# Patient Record
Sex: MALE | Race: WHITE | NOT HISPANIC OR LATINO | Employment: UNEMPLOYED | URBAN - METROPOLITAN AREA
[De-identification: names, ages, dates, MRNs, and addresses within clinical notes are randomized per-mention and may not be internally consistent; named-entity substitution may affect disease eponyms.]

---

## 2023-08-04 ENCOUNTER — APPOINTMENT (OUTPATIENT)
Dept: CT IMAGING | Facility: HOSPITAL | Age: 23
End: 2023-08-04
Payer: MEDICAID

## 2023-08-04 ENCOUNTER — HOSPITAL ENCOUNTER (EMERGENCY)
Facility: HOSPITAL | Age: 23
Discharge: HOME | End: 2023-08-04
Attending: EMERGENCY MEDICINE
Payer: MEDICAID

## 2023-08-04 VITALS
WEIGHT: 198.63 LBS | HEIGHT: 65 IN | BODY MASS INDEX: 33.09 KG/M2 | DIASTOLIC BLOOD PRESSURE: 80 MMHG | OXYGEN SATURATION: 99 % | RESPIRATION RATE: 18 BRPM | HEART RATE: 101 BPM | TEMPERATURE: 97.7 F | SYSTOLIC BLOOD PRESSURE: 121 MMHG

## 2023-08-04 DIAGNOSIS — R10.9 ABDOMINAL PAIN, UNSPECIFIED ABDOMINAL LOCATION: Primary | ICD-10-CM

## 2023-08-04 DIAGNOSIS — K59.00 CONSTIPATION, UNSPECIFIED CONSTIPATION TYPE: ICD-10-CM

## 2023-08-04 LAB
ALBUMIN SERPL-MCNC: 4.1 G/DL (ref 3.5–5.2)
ALBUMIN/GLOB SERPL: 1.3 G/DL
ALP SERPL-CCNC: 99 U/L (ref 39–117)
ALT SERPL W P-5'-P-CCNC: 21 U/L (ref 1–33)
ANION GAP SERPL CALCULATED.3IONS-SCNC: 13 MMOL/L (ref 5–15)
AST SERPL-CCNC: 25 U/L (ref 1–32)
B-HCG UR QL: NEGATIVE
BACTERIA UR QL AUTO: ABNORMAL /HPF
BASOPHILS # BLD AUTO: 0.1 10*3/MM3 (ref 0–0.2)
BASOPHILS NFR BLD AUTO: 0.8 % (ref 0–1.5)
BILIRUB SERPL-MCNC: 0.3 MG/DL (ref 0–1.2)
BILIRUB UR QL STRIP: NEGATIVE
BUN SERPL-MCNC: 10 MG/DL (ref 6–20)
BUN/CREAT SERPL: 9.9 (ref 7–25)
CALCIUM SPEC-SCNC: 9.1 MG/DL (ref 8.6–10.5)
CHLORIDE SERPL-SCNC: 104 MMOL/L (ref 98–107)
CLARITY UR: CLEAR
CO2 SERPL-SCNC: 15 MMOL/L (ref 22–29)
COLOR UR: YELLOW
CREAT SERPL-MCNC: 1.01 MG/DL (ref 0.57–1)
DEPRECATED RDW RBC AUTO: 44.2 FL (ref 37–54)
EGFRCR SERPLBLD CKD-EPI 2021: 80.4 ML/MIN/1.73
EOSINOPHIL # BLD AUTO: 0.3 10*3/MM3 (ref 0–0.4)
EOSINOPHIL NFR BLD AUTO: 4.5 % (ref 0.3–6.2)
ERYTHROCYTE [DISTWIDTH] IN BLOOD BY AUTOMATED COUNT: 15.3 % (ref 12.3–15.4)
GLOBULIN UR ELPH-MCNC: 3.2 GM/DL
GLUCOSE SERPL-MCNC: 95 MG/DL (ref 65–99)
GLUCOSE UR STRIP-MCNC: NEGATIVE MG/DL
HCT VFR BLD AUTO: 41.3 % (ref 37.5–51)
HGB BLD-MCNC: 13.5 G/DL (ref 13–17.7)
HGB UR QL STRIP.AUTO: ABNORMAL
HOLD SPECIMEN: NORMAL
HYALINE CASTS UR QL AUTO: ABNORMAL /LPF
KETONES UR QL STRIP: NEGATIVE
LEUKOCYTE ESTERASE UR QL STRIP.AUTO: ABNORMAL
LIPASE SERPL-CCNC: 24 U/L (ref 13–60)
LYMPHOCYTES # BLD AUTO: 1.8 10*3/MM3 (ref 0.7–3.1)
LYMPHOCYTES NFR BLD AUTO: 26.9 % (ref 19.6–45.3)
MCH RBC QN AUTO: 27.3 PG (ref 26.6–33)
MCHC RBC AUTO-ENTMCNC: 32.6 G/DL (ref 31.5–35.7)
MCV RBC AUTO: 83.7 FL (ref 79–97)
MONOCYTES # BLD AUTO: 0.4 10*3/MM3 (ref 0.1–0.9)
MONOCYTES NFR BLD AUTO: 5.6 % (ref 5–12)
NEUTROPHILS NFR BLD AUTO: 4.1 10*3/MM3 (ref 1.7–7)
NEUTROPHILS NFR BLD AUTO: 62.2 % (ref 42.7–76)
NITRITE UR QL STRIP: NEGATIVE
NRBC BLD AUTO-RTO: 0 /100 WBC (ref 0–0.2)
PH UR STRIP.AUTO: 8 [PH] (ref 5–8)
PLATELET # BLD AUTO: 369 10*3/MM3 (ref 140–450)
PMV BLD AUTO: 7 FL (ref 6–12)
POTASSIUM SERPL-SCNC: 4.1 MMOL/L (ref 3.5–5.2)
PROT SERPL-MCNC: 7.3 G/DL (ref 6–8.5)
PROT UR QL STRIP: NEGATIVE
RBC # BLD AUTO: 4.93 10*6/MM3 (ref 4.14–5.8)
RBC # UR STRIP: ABNORMAL /HPF
REF LAB TEST METHOD: ABNORMAL
SODIUM SERPL-SCNC: 132 MMOL/L (ref 136–145)
SP GR UR STRIP: 1.01 (ref 1–1.03)
SQUAMOUS #/AREA URNS HPF: ABNORMAL /HPF
UROBILINOGEN UR QL STRIP: ABNORMAL
WBC # UR STRIP: ABNORMAL /HPF
WBC NRBC COR # BLD: 6.6 10*3/MM3 (ref 3.4–10.8)
WHOLE BLOOD HOLD COAG: NORMAL
WHOLE BLOOD HOLD SPECIMEN: NORMAL

## 2023-08-04 PROCEDURE — 85025 COMPLETE CBC W/AUTO DIFF WBC: CPT

## 2023-08-04 PROCEDURE — 25010000002 ONDANSETRON PER 1 MG: Performed by: PHYSICIAN ASSISTANT

## 2023-08-04 PROCEDURE — 80053 COMPREHEN METABOLIC PANEL: CPT

## 2023-08-04 PROCEDURE — 81001 URINALYSIS AUTO W/SCOPE: CPT | Performed by: PHYSICIAN ASSISTANT

## 2023-08-04 PROCEDURE — 99285 EMERGENCY DEPT VISIT HI MDM: CPT

## 2023-08-04 PROCEDURE — 81025 URINE PREGNANCY TEST: CPT | Performed by: PHYSICIAN ASSISTANT

## 2023-08-04 PROCEDURE — 74177 CT ABD & PELVIS W/CONTRAST: CPT

## 2023-08-04 PROCEDURE — 25510000001 IOPAMIDOL PER 1 ML: Performed by: PHYSICIAN ASSISTANT

## 2023-08-04 PROCEDURE — P9612 CATHETERIZE FOR URINE SPEC: HCPCS

## 2023-08-04 PROCEDURE — 83690 ASSAY OF LIPASE: CPT | Performed by: PHYSICIAN ASSISTANT

## 2023-08-04 PROCEDURE — 96374 THER/PROPH/DIAG INJ IV PUSH: CPT

## 2023-08-04 RX ORDER — POLYETHYLENE GLYCOL 3350 17 G/17G
17 POWDER, FOR SOLUTION ORAL DAILY
Qty: 510 G | Refills: 0 | Status: SHIPPED | OUTPATIENT
Start: 2023-08-04

## 2023-08-04 RX ORDER — ONDANSETRON 2 MG/ML
4 INJECTION INTRAMUSCULAR; INTRAVENOUS ONCE
Status: COMPLETED | OUTPATIENT
Start: 2023-08-04 | End: 2023-08-04

## 2023-08-04 RX ORDER — SODIUM CHLORIDE 0.9 % (FLUSH) 0.9 %
10 SYRINGE (ML) INJECTION AS NEEDED
Status: DISCONTINUED | OUTPATIENT
Start: 2023-08-04 | End: 2023-08-04 | Stop reason: HOSPADM

## 2023-08-04 RX ADMIN — ONDANSETRON 4 MG: 2 INJECTION INTRAMUSCULAR; INTRAVENOUS at 15:46

## 2023-08-04 RX ADMIN — IOPAMIDOL 100 ML: 755 INJECTION, SOLUTION INTRAVENOUS at 16:41

## 2023-08-04 NOTE — ED NOTES
Pt has been in the bathroom with significant other present for over 20 min. Will finish labs and reassess vitals once they return to room.

## 2023-08-04 NOTE — DISCHARGE INSTRUCTIONS
Take your medication at home as prescribed.  Recommend taking MiraLAX once daily, can increase to twice daily or every other day depending on your body's response    Recommend following up with your urologist for reevaluation of your medications as needed  Follow-up with general surgery if your rectal prolapse worsens    Return to the ER for new or worsening symptoms

## 2023-08-04 NOTE — ED PROVIDER NOTES
Subjective   History of Present Illness  Chief Complaint: Abdominal pain, constipation    Patient is a 23-year-old  female that identifies as male history of arthritis, asthma presents to the ER with complaints of abdominal pain, distention and feelings of constipation for a few weeks.  The patient states that he has had issues with urinary incontinence for quite some time, he currently takes oxybutynin and another medication for incontinence.  He states he feels that these medications are making him constipated.  He states that he has been taking stool softeners and laxatives and did have a very small bowel movement this morning.  Denies chest pain or shortness of breath.  No headache or fever or chills.  He reports no significant abdominal surgical history.  Patient states that he feels very bloated but is passing gas today.  Patient also reports rectal prolapse, states that he has been dealing with this for quite some time and has had colonoscopies in the past but has not been seen by general surgery.  He states that the prolapse at this time is minimal does report discomfort which he attributes to straining due to his constipation.  He also states that since taking oxybutynin and other urinary incontinence medications he feels he has to strain in order to pee which then causes his rectal prolapse to be worse.  He denies any anal intercourse or trauma.    PCP: Jackie Uriarte    History provided by:  Patient    Review of Systems   Constitutional:  Negative for chills and fever.   HENT:  Negative for sore throat and trouble swallowing.    Respiratory:  Negative for shortness of breath and wheezing.    Cardiovascular:  Negative for chest pain.   Gastrointestinal:  Positive for abdominal distention, abdominal pain, constipation, nausea and rectal pain. Negative for diarrhea and vomiting.        Rectal prolapse   Genitourinary:  Negative for dysuria, vaginal bleeding and vaginal discharge.   Skin:  Negative  for rash.   Neurological:  Negative for weakness and headaches.   Psychiatric/Behavioral:  Negative for behavioral problems.    All other systems reviewed and are negative.    Past Medical History:   Diagnosis Date    Arthritis     Asthma     Depression     Environmental allergies        Allergies   Allergen Reactions    Cariprazine Nausea And Vomiting, Palpitations and Shortness Of Breath    Fluoxetine Hcl Other (See Comments)    Testosterone Cypionate Itching, Rash and Swelling       No past surgical history on file.    No family history on file.    Social History     Socioeconomic History    Marital status: Single   Tobacco Use    Smoking status: Never   Substance and Sexual Activity    Alcohol use: No    Drug use: No           Objective   Physical Exam  Vitals and nursing note reviewed.   Constitutional:       Appearance: Normal appearance. He is well-developed and normal weight. He is not ill-appearing or toxic-appearing.   HENT:      Head: Normocephalic and atraumatic.      Mouth/Throat:      Mouth: Mucous membranes are moist.      Pharynx: No oropharyngeal exudate.   Eyes:      Pupils: Pupils are equal, round, and reactive to light.   Cardiovascular:      Rate and Rhythm: Normal rate and regular rhythm. Tachycardia present.      Pulses: Normal pulses.      Heart sounds: Normal heart sounds. No murmur heard.  Pulmonary:      Effort: Pulmonary effort is normal. No respiratory distress.      Breath sounds: Normal breath sounds. No wheezing.   Abdominal:      General: Bowel sounds are normal. There is no distension.      Palpations: Abdomen is soft.      Tenderness: There is abdominal tenderness. There is no guarding.   Genitourinary:     Comments: Patient reports rectum is not prolapsed at this time  Skin:     General: Skin is warm and dry.      Capillary Refill: Capillary refill takes less than 2 seconds.      Findings: No rash.   Neurological:      General: No focal deficit present.      Mental Status: He is  "alert and oriented to person, place, and time.   Psychiatric:         Mood and Affect: Mood normal.         Behavior: Behavior normal.       Procedures           ED Course  ED Course as of 08/04/23 1726   Fri Aug 04, 2023   1707 Patient had large bowel movement while in the ER and does report feeling better vital signs improved.  Lab work and imaging discussed with patient at bedside, all questions answered. [MC]      ED Course User Index  [MC] Bhumika Luna PA    /80 (BP Location: Right arm, Patient Position: Lying)   Pulse 101   Temp 97.7 øF (36.5 øC) (Oral)   Resp 18   Ht 165.1 cm (65\")   Wt 90.1 kg (198 lb 10.2 oz)   SpO2 99%   BMI 33.05 kg/mý   Labs Reviewed   COMPREHENSIVE METABOLIC PANEL - Abnormal; Notable for the following components:       Result Value    Creatinine 1.01 (*)     Sodium 132 (*)     CO2 15.0 (*)     All other components within normal limits    Narrative:     GFR Normal >60  Chronic Kidney Disease <60  Kidney Failure <15     URINALYSIS W/ CULTURE IF INDICATED - Abnormal; Notable for the following components:    Blood, UA Moderate (2+) (*)     Leuk Esterase, UA Moderate (2+) (*)     All other components within normal limits    Narrative:     In absence of clinical symptoms, the presence of pyuria, bacteria, and/or nitrites on the urinalysis result does not correlate with infection.   URINALYSIS, MICROSCOPIC ONLY - Abnormal; Notable for the following components:    RBC, UA 13-20 (*)     WBC, UA 3-5 (*)     All other components within normal limits   CBC WITH AUTO DIFFERENTIAL - Normal   LIPASE - Normal   PREGNANCY, URINE - Normal   RAINBOW DRAW    Narrative:     The following orders were created for panel order [place] Draw.  Procedure                               Abnormality         Status                     ---------                               -----------         ------                     Green Top (Gel)[[id number]]                                  Final result            "    Lavender Top[[id number]]                                     Final result               Gold Top - SST[[id number]]                                   Final result               Light Blue Top[[id number]]                                   Final result                 Please view results for these tests on the individual orders.   CBC AND DIFFERENTIAL    Narrative:     The following orders were created for panel order CBC & Differential.  Procedure                               Abnormality         Status                     ---------                               -----------         ------                     CBC Auto Differential[[id number]]        Normal              Final result                 Please view results for these tests on the individual orders.   GREEN TOP   LAVENDER TOP   GOLD TOP - SST   LIGHT BLUE TOP   EXTRA TUBES    Narrative:     The following orders were created for panel order Extra Tubes.  Procedure                               Abnormality         Status                     ---------                               -----------         ------                     Gold Top - SST[[id number]]                                   Final result                 Please view results for these tests on the individual orders.   GOLD TOP - SST     Medications   sodium chloride 0.9 % flush 10 mL (has no administration in time range)   sodium chloride 0.9 % flush 10 mL (has no administration in time range)   ondansetron (ZOFRAN) injection 4 mg (4 mg Intravenous Given 8/4/23 1546)   iopamidol (ISOVUE-370) 76 % injection 100 mL (100 mL Intravenous Given 8/4/23 1641)     CT Abdomen Pelvis With Contrast    Result Date: 8/4/2023  Impression: No acute abnormality in the abdomen or pelvis. Electronically Signed: Herbert Liang  8/4/2023 4:49 PM EDT  Workstation ID: [id number]                                          Medical Decision Making  Differential Dx (Includes but not limited to): Constipation, obstruction, prolapse, UTI,  diverticulitis, bowel obstruction, fistula, perforation  Medical Records Reviewed: No pertinent records to review  Labs:, Interpretation urinalysis negative for UTI, no bacteria.  CBC no leukocytosis, CMP sodium 132.  Pregnancy negative.  Imaging: On my interpretation no obvious infectious process or signs of obstruction  Telemetry: N/A  Testing considered but not ordered: Chest x-ray patient denies chest pain or shortness of breath  Nature of Complaint: Acute  Admission vs Discharge: Discharge  Discussion: While in the ED IV was placed and labs were obtained appropriate PPE was worn during exam and throughout all encounters with the patient.  Patient had the above evaluation.  IV established, lab work obtained patient given Zofran for nausea.  Lab work reassuring.  CT abdomen shows no acute infectious process or signs of obstruction.  Patient was able to have successful large bowel movement while in the ER.  He reports that he currently does not have rectal prolapse, seems to only be an issue when he is straining.  Strongly recommended daily stool softener to prevent further constipation and straining.  Recommended follow-up with his urologist and with general surgery if prolapse becomes worse.    Discharge plan and instructions were discussed with the patient who verbalized understanding and is in agreement with the plan, all questions were answered at this time.  Patient is aware of signs symptoms that would require immediate return to the emergency room.  Patient understands importance of following up with primary care provider for further evaluation and worsening concerns as well as blood pressure recheck in the next 4 weeks.    Patient was discharged in improved stable condition with an upright steady gait.    Patient is aware that discharge does not mean that nothing is wrong but indicates no emergencies present and they must continue care with follow-up as given below or physician of his choice.    Problems  Addressed:  Abdominal pain, unspecified abdominal location: acute illness or injury  Constipation, unspecified constipation type: acute illness or injury    Amount and/or Complexity of Data Reviewed  Labs: ordered. Decision-making details documented in ED Course.  Radiology: ordered. Decision-making details documented in ED Course.    Risk  Prescription drug management.        Final diagnoses:   Abdominal pain, unspecified abdominal location   Constipation, unspecified constipation type       ED Disposition  ED Disposition       ED Disposition   Discharge    Condition   Stable    Comment   --               Jackie Uriarte  [address] [place] [address] [phone]    Schedule an appointment as soon as possible for a visit in 2 days  As needed, If symptoms worsen    Eli Canales MD  [address] [place] [address] [phone]    Schedule an appointment as soon as possible for a visit in 2 days  As needed, If symptoms worsen         Medication List        New Prescriptions      polyethylene glycol 17 GM/SCOOP powder  Commonly known as: MIRALAX  Take 17 g by mouth Daily.               Where to Get Your Medications        These medications were sent to [id number] - [place], [place] - [address] AT [facility] - [phone]  - [phone] FX  [address] [place] [address]      Phone: [phone]   polyethylene glycol 17 GM/SCOOP powder            Bhumika Luna PA  08/04/23 4165

## 2023-08-24 ENCOUNTER — OFFICE VISIT (OUTPATIENT)
Dept: SURGERY | Facility: CLINIC | Age: 23
End: 2023-08-24
Payer: MEDICAID

## 2023-08-24 VITALS
DIASTOLIC BLOOD PRESSURE: 87 MMHG | HEART RATE: 128 BPM | TEMPERATURE: 99.3 F | WEIGHT: 199.2 LBS | BODY MASS INDEX: 33.19 KG/M2 | HEIGHT: 65 IN | SYSTOLIC BLOOD PRESSURE: 126 MMHG | OXYGEN SATURATION: 98 %

## 2023-08-24 DIAGNOSIS — K64.9 HEMORRHOIDS, UNSPECIFIED HEMORRHOID TYPE: Primary | ICD-10-CM

## 2023-08-24 PROCEDURE — 99204 OFFICE O/P NEW MOD 45 MIN: CPT | Performed by: SURGERY

## 2023-08-24 PROCEDURE — 1159F MED LIST DOCD IN RCRD: CPT | Performed by: SURGERY

## 2023-08-24 PROCEDURE — 1160F RVW MEDS BY RX/DR IN RCRD: CPT | Performed by: SURGERY

## 2023-08-24 RX ORDER — CIPROFLOXACIN 500 MG/1
TABLET, FILM COATED ORAL
COMMUNITY
Start: 2023-08-02

## 2023-08-24 RX ORDER — TOLTERODINE 4 MG/1
1 CAPSULE, EXTENDED RELEASE ORAL DAILY
COMMUNITY

## 2023-08-24 RX ORDER — GABAPENTIN 300 MG/1
CAPSULE ORAL
COMMUNITY
Start: 2023-07-18

## 2023-08-24 RX ORDER — SYRINGE WITH NEEDLE, 1 ML 25GX5/8"
SYRINGE, EMPTY DISPOSABLE MISCELLANEOUS
COMMUNITY
Start: 2023-07-17

## 2023-08-24 RX ORDER — PROPRANOLOL HYDROCHLORIDE 10 MG/1
1 TABLET ORAL 2 TIMES DAILY
COMMUNITY

## 2023-08-24 RX ORDER — NEEDLES, DISPOSABLE 25GX5/8"
NEEDLE, DISPOSABLE MISCELLANEOUS
COMMUNITY
Start: 2023-08-15

## 2023-08-24 RX ORDER — CYCLOBENZAPRINE HCL 10 MG
10 TABLET ORAL
COMMUNITY
Start: 2023-08-21

## 2023-08-24 RX ORDER — SYRINGE WITH NEEDLE, 1 ML 25GX5/8"
SYRINGE, EMPTY DISPOSABLE MISCELLANEOUS
COMMUNITY
Start: 2023-08-05

## 2023-08-24 RX ORDER — ALBUTEROL SULFATE 90 UG/1
AEROSOL, METERED RESPIRATORY (INHALATION)
COMMUNITY

## 2023-08-24 RX ORDER — CETIRIZINE HYDROCHLORIDE 10 MG/1
10 TABLET ORAL DAILY
COMMUNITY

## 2023-08-24 RX ORDER — CONJUGATED ESTROGENS 0.62 MG/G
CREAM VAGINAL
COMMUNITY
Start: 2023-08-09

## 2023-08-24 RX ORDER — OMEGA-3-ACID ETHYL ESTERS 1 G/1
1 CAPSULE, LIQUID FILLED ORAL EVERY 12 HOURS SCHEDULED
COMMUNITY
Start: 2023-08-16

## 2023-08-24 RX ORDER — MEDROXYPROGESTERONE ACETATE 150 MG/ML
150 INJECTION, SUSPENSION INTRAMUSCULAR
COMMUNITY

## 2023-08-24 RX ORDER — OXYBUTYNIN CHLORIDE 10 MG/1
1 TABLET, EXTENDED RELEASE ORAL DAILY
COMMUNITY

## 2023-08-24 RX ORDER — DULOXETIN HYDROCHLORIDE 20 MG/1
1 CAPSULE, DELAYED RELEASE ORAL EVERY 12 HOURS SCHEDULED
COMMUNITY
Start: 2023-08-18

## 2023-08-24 RX ORDER — SYRINGE, DISPOSABLE, 1 ML
SYRINGE, EMPTY DISPOSABLE MISCELLANEOUS
COMMUNITY
Start: 2023-06-24

## 2023-08-24 RX ORDER — CLONAZEPAM 0.5 MG/1
1 TABLET, ORALLY DISINTEGRATING ORAL 2 TIMES DAILY
COMMUNITY

## 2023-08-24 RX ORDER — OMEPRAZOLE 40 MG/1
40 CAPSULE, DELAYED RELEASE ORAL DAILY
COMMUNITY

## 2023-08-24 RX ORDER — HYDROXYZINE HYDROCHLORIDE 25 MG/1
TABLET, FILM COATED ORAL
COMMUNITY
Start: 2023-08-18

## 2023-08-24 RX ORDER — MULTIVITAMIN WITH FOLIC ACID 400 MCG
TABLET ORAL
COMMUNITY

## 2023-08-24 RX ORDER — DEXTROAMPHETAMINE SACCHARATE, AMPHETAMINE ASPARTATE, DEXTROAMPHETAMINE SULFATE AND AMPHETAMINE SULFATE 5; 5; 5; 5 MG/1; MG/1; MG/1; MG/1
1 TABLET ORAL DAILY
COMMUNITY
Start: 2023-07-24

## 2023-08-24 RX ORDER — NEEDLES, DISPOSABLE 25GX5/8"
NEEDLE, DISPOSABLE MISCELLANEOUS
COMMUNITY
Start: 2023-04-27

## 2023-08-24 RX ORDER — TESTOSTERONE ENANTHATE 200 MG/ML
INJECTION, SOLUTION INTRAMUSCULAR
COMMUNITY
Start: 2023-07-31

## 2023-08-24 RX ORDER — DIAZEPAM 10 MG/1
TABLET ORAL
COMMUNITY
Start: 2023-08-02

## 2023-08-24 RX ORDER — LUMATEPERONE 42 MG/1
1 CAPSULE ORAL DAILY
COMMUNITY
Start: 2023-07-21

## 2023-08-24 RX ORDER — AZITHROMYCIN 250 MG/1
TABLET, FILM COATED ORAL
COMMUNITY

## 2023-09-07 ENCOUNTER — OFFICE VISIT (OUTPATIENT)
Dept: SURGERY | Facility: CLINIC | Age: 23
End: 2023-09-07
Payer: MEDICAID

## 2023-09-07 VITALS
HEART RATE: 92 BPM | SYSTOLIC BLOOD PRESSURE: 130 MMHG | BODY MASS INDEX: 33.99 KG/M2 | OXYGEN SATURATION: 99 % | WEIGHT: 204 LBS | HEIGHT: 65 IN | TEMPERATURE: 99.1 F | DIASTOLIC BLOOD PRESSURE: 92 MMHG

## 2023-09-07 DIAGNOSIS — K64.8 INTERNAL HEMORRHOIDS: Primary | ICD-10-CM

## 2023-09-07 PROCEDURE — 1159F MED LIST DOCD IN RCRD: CPT | Performed by: SURGERY

## 2023-09-07 PROCEDURE — 1160F RVW MEDS BY RX/DR IN RCRD: CPT | Performed by: SURGERY

## 2023-09-07 PROCEDURE — 99213 OFFICE O/P EST LOW 20 MIN: CPT | Performed by: SURGERY

## 2023-09-07 RX ORDER — CALCIUM POLYCARBOPHIL 625 MG
625 TABLET ORAL DAILY
Qty: 30 TABLET | Refills: 2 | Status: SHIPPED | OUTPATIENT
Start: 2023-09-07 | End: 2023-12-06

## 2023-09-07 NOTE — PROGRESS NOTES
CHIEF COMPLAINT:    Chief Complaint   Patient presents with    Hemorrhoids     Follow up colonoscopy by Dr. Bashir       HISTORY OF PRESENT ILLNESS:    Damian Casillas is a 23 y.o. adult who underwent upper and lower endoscopies by Dr. Bashir for multiple GI related complaints.  Previously seen the patient for potential rectal prolapse which was not demonstrable clinically.  The colonoscopy report was reviewed showing grade 2 internal hemorrhoids as well as potential evidence of IBS.  This was discussed with the patient today.    EXAM:  Vitals:    09/07/23 1148   BP: 130/92   Pulse:    Temp:    SpO2:          No acute distress    ASSESSMENT:    Probable bleeding and prolapse of internal hemorrhoids    PLAN:    After significant discussion today I believe that we arrived at the conclusion that the patient has bleeding and intermittent prolapse of internal hemorrhoidal tissue.  We discussed potential hemorrhoidectomy versus medical treatment to include fiber supplementation.  The patient notes that recently multiple constipating medications have stopped.  Currently having bowel movements every 3 to 4 days.  Recently had Botox injection in the bladder but continues to have to strain significantly for urination.  Ultimately, I suspect that if the bladder issues and straining could be resolved and the constipation could be improved that the hemorrhoids would no longer be a problem.  I will see him back in about a month to recheck.  If there are continued issues at that time we may consider rectal exam under anesthesia with potential for hemorrhoidectomy.          This document has been electronically signed by eZb Lane MD on September 7, 2023 12:22 EDT

## 2023-10-19 ENCOUNTER — OFFICE VISIT (OUTPATIENT)
Dept: SURGERY | Facility: CLINIC | Age: 23
End: 2023-10-19
Payer: MEDICAID

## 2023-10-19 VITALS
HEART RATE: 102 BPM | HEIGHT: 65 IN | DIASTOLIC BLOOD PRESSURE: 77 MMHG | OXYGEN SATURATION: 96 % | SYSTOLIC BLOOD PRESSURE: 112 MMHG | BODY MASS INDEX: 34.69 KG/M2 | WEIGHT: 208.2 LBS | TEMPERATURE: 99.8 F

## 2023-10-19 DIAGNOSIS — Z09 ENCOUNTER FOR FOLLOW-UP: Primary | ICD-10-CM

## 2023-10-19 RX ORDER — DICYCLOMINE HYDROCHLORIDE 10 MG/1
10 CAPSULE ORAL
COMMUNITY
Start: 2023-10-16

## 2023-10-19 NOTE — PROGRESS NOTES
CHIEF COMPLAINT:    Chief Complaint   Patient presents with    Hemorrhoids     6 wk follow up hemorrhoids       HISTORY OF PRESENT ILLNESS:    Damian Casillas is a 23 y.o. adult who has been seen previously for what have been found to be grade 2 internal hemorrhoids.  Since starting Bentyl and modifying diet, as well as Botox to the bladder the hemorrhoidal issues have now resolved.  Damian is having daily bowel movements without difficulty.    EXAM:  Vitals:    10/19/23 1043   BP: 112/77   Pulse: 102   Temp: 99.8 °F (37.7 °C)   SpO2: 96%         No distress    ASSESSMENT:    Internal Hemorrhoids    PLAN:    Continue current plan of care per PCP.  See us as needed.          This document has been electronically signed by Zeb Lane MD on October 19, 2023 11:07 EDT

## 2023-11-15 NOTE — PROGRESS NOTES
CHIEF COMPLAINT:    Possible rectal prolapse    HISTORY OF PRESENT ILLNESS:    Damian Casillas is a 23 y.o. adult who is anatomically and genetically female but identifies as male.  The patient was recently seen in the ER on 8/4/2023 with concerns about possible rectal prolapse, however, no prolapse was demonstrable during the ER visit.  The patient notes chronic issues with having to strain to urinate and feels this has lead to rectal prolapse issues.  Notes that a colonoscopy is scheduled on 9/1/2023 in [employer] [facility].  Has concerns about prolapse which has not caused pain. Notes no episodes of bleeding.     Past Medical History:   Diagnosis Date    ADHD     Anemia     Arthritis     Asthma     Autism     level 2    Bipolar 1 disorder     Depression     Depressive disorder     Dyslipidemia     Dysmenorrhea     Endocrine disorder     Environmental allergies     Fatigue     Fibrocystic breast     Fibromyalgia     Gender dysphoria     GERD (gastroesophageal reflux disease)     History of MRSA infection     History of suicide attempt     Hyperchloremia     Hyperlordosis deformity of lumbar spine     Hypertriglyceridemia     Hypovitaminosis D     Overactive bladder     Panic     PTSD (post-traumatic stress disorder)     TMJ (dislocation of temporomandibular joint)     Vertigo        Past Surgical History:   Procedure Laterality Date    COLONOSCOPY      ENDOSCOPY      MASS EXCISION Left     arm    NECK MASS EXCISION         Prior to Admission medications    Medication Sig Start Date End Date Taking? Authorizing Provider   albuterol sulfate  (90 Base) MCG/ACT inhaler Inhale 1-2 puffs every 4-6 hours as needed for shortness of breath   Yes ProviderAlexis MD   amphetamine-dextroamphetamine (ADDERALL) 20 MG tablet Take 1 tablet by mouth Daily. 7/24/23  Yes Alexis King MD   azithromycin (ZITHROMAX) 250 MG tablet Take 2 tablet(s) day one then 1 tablet days 2 thru 5   Yes Alexis King MD B-D [facility] LUER-KEYLA  "SYR 88CG2-3/2 18G X 1-1/2\" 3 ML misc USE TO DRAW UP MEDICATION FOR WEEKLY INJECTION 7/17/23  Yes ProviderAlexis MD B-D 3CC LUER-KEYLA SYR 25GX1\" 25G X 1\" 3 ML misc USE FOR TESTOSTERONE INJECTION WEEKLY AS DIRECTED 8/5/23  Yes Alexis King MD   BD Disp Needles 18G X 1-1/2\" misc USE TO DRAW UP MEDICATION FOR INJECTION WEEKLY AS DIRECTED 4/27/23  Yes ProviderAlexis MD   BD Hypodermic Needle 18G X 1\" misc  8/15/23  Yes Alexis King MD   BD Syringe Slip Tip 25G X 5/8\" 1 ML misc USE WEEKLY FOR INJECTION OF UNDER THE SKIN MEDICATION AS DIRECTED 6/24/23  Yes ProviderAlexis MD   Caplyta 42 MG capsule Take 1 capsule by mouth Daily. 7/21/23  Yes Alexis King MD   cetirizine (zyrTEC) 10 MG tablet Take 1 tablet by mouth Daily.   Yes Provider, MD Alexis   Cholecalciferol 50 MCG (2000 UT) capsule Take 1 capsule by mouth Every Morning.   Yes Provider, MD Alexis   ciprofloxacin (CIPRO) 500 MG tablet TAKE 1 TABLET BY MOUTH TWICE DAILY. START THE DAY BEFORE PROCEDURE 8/2/23  Yes ProviderAlexis MD   clonazePAM (KlonoPIN) 0.5 MG disintegrating tablet Take 1 tablet by mouth 2 (Two) Times a Day.   Yes Provider, MD Alexis   Cyanocobalamin ([employer] B12 GUMMIES PO) Take  by mouth.   Yes Provider, MD Alexis   Cyanocobalamin ([employer] B12 Gummies) 500 MCG chewable tablet Chew.   Yes Provider, MD Alexis   cyclobenzaprine (FLEXERIL) 10 MG tablet Take 1 tablet by mouth every night at bedtime. 8/21/23  Yes Alexis King MD   diazePAM (VALIUM) 10 MG tablet TAKE 1 TABLET BY MOUTH 1 HOUR PRIOR TO PROCEDURE 8/2/23  Yes ProviderAlexis MD   DULoxetine (CYMBALTA) 20 MG capsule Take 1 capsule by mouth Every 12 (Twelve) Hours. 8/18/23  Yes Alexis King MD   gabapentin (NEURONTIN) 300 MG capsule  7/18/23  Yes Alexis King MD   hydrOXYzine (ATARAX) 25 MG tablet  8/18/23  Yes ProviderAlexis MD   medroxyPROGESTERone (DEPO-PROVERA) 150 MG/ML injection " Inject 1 mL into the appropriate muscle as directed by prescriber Every 3 (Three) Months.   Yes Alexis King MD   omega-3 acid ethyl esters (LOVAZA) 1 g capsule Take 1 capsule by mouth Every 12 (Twelve) Hours. 8/16/23  Yes Alexis King MD   omeprazole (priLOSEC) 40 MG capsule Take 1 capsule by mouth Daily.   Yes Alexis King MD   oxybutynin XL (DITROPAN-XL) 10 MG 24 hr tablet Take 1 tablet by mouth Daily.   Yes Alexis King MD   Premarin 0.625 MG/GM vaginal cream INSERT 0.5 APPLICATORFUL BY VAGINAL ROUTE EVERY DAY. CYCLE 3 WEEKS ON 1 WEEK OFF 8/9/23  Yes Alexis King MD   propranolol (INDERAL) 10 MG tablet Take 1 tablet by mouth 2 (Two) Times a Day.   Yes Alexis King MD   Testosterone Enanthate 200 MG/ML solution INJECT 0.25ML IN THE MUSCLE EVERY WEEK 7/31/23  Yes Alexis King MD   tolterodine LA (DETROL LA) 4 MG 24 hr capsule Take 1 capsule by mouth Daily.   Yes Alexis King MD   cephalexin (KEFLEX) 500 MG capsule Take 1 capsule by mouth 3 (Three) Times a Day.  Patient not taking: Reported on 8/24/2023 9/3/19   Bhumika Gage PA-C   polyethylene glycol (MIRALAX) 17 GM/SCOOP powder Take 17 g by mouth Daily. 8/4/23 8/31/23  Bhumika Luna PA   sulfamethoxazole-trimethoprim (BACTRIM DS,SEPTRA DS) 800-160 MG per tablet Take 1 tablet by mouth 2 (Two) Times a Day.  Patient not taking: Reported on 8/24/2023 9/3/19 8/31/23  Bhumika Gage PA-C       Allergies   Allergen Reactions    Cariprazine Nausea And Vomiting, Palpitations and Shortness Of Breath    Fluoxetine Hcl Other (See Comments)    Testosterone Cypionate Itching, Rash and Swelling       Family History   Problem Relation Age of Onset    No Known Problems Mother     Mental illness Father     Asthma Father     Heart disease Father        Social History     Socioeconomic History    Marital status: Single   Tobacco Use    Smoking status: Never     Passive exposure: Past    Smokeless tobacco:  "Never    Tobacco comments:     Vapes every other day   Substance and Sexual Activity    Alcohol use: No    Drug use: No       Review of Systems   Gastrointestinal:  Positive for constipation and diarrhea.   Genitourinary:  Positive for difficulty urinating.     Objective     /87 (Cuff Size: Adult)   Pulse (!) 128   Temp 99.3 øF (37.4 øC) (Infrared)   Ht 165.1 cm (65\")   Wt 90.4 kg (199 lb 3.2 oz)   SpO2 98%   BMI 33.15 kg/mý     Physical Exam  Exam conducted with a chaperone present.   Constitutional:       General: He is not in acute distress.     Appearance: He is not ill-appearing, toxic-appearing or diaphoretic.   HENT:      Head: Normocephalic and atraumatic.   Eyes:      General:         Right eye: No discharge.         Left eye: No discharge.      Extraocular Movements: Extraocular movements intact.      Conjunctiva/sclera: Conjunctivae normal.   Pulmonary:      Effort: Pulmonary effort is normal. No respiratory distress.   Genitourinary:      Skin:     General: Skin is warm.   Neurological:      General: No focal deficit present.      Mental Status: He is alert and oriented to person, place, and time.       DIAGNOSTIC DATA:    ER note from 8/4/2023 visit reviewed. CT from 8/4/2023 images and report reviewed.    ASSESSMENT:    Self-reported apparent rectal prolapse at home.  Not demonstrable on exam    PLAN:    The patient has a litany of complaints related to both urination and defecation.  Reports that they have seen intestine prolapsing previously while on the toilet.  On multiple Valsalva's today there is no evidence of prolapse of rectal mucosa nor internal hemorrhoids.  Patient does have moderate sized external hemorrhoids.  I recommended they keep their appointment for colonoscopy.  They can follow-up with us in 2 weeks so that we can discuss the colonoscopy results and any plans moving forward.  Currently no indication that surgery would be needed or recommended.          This document has " been electronically signed by Zeb Lane MD on August 31, 2023 10:26 EDT       Never

## 2024-01-27 ENCOUNTER — HOSPITAL ENCOUNTER (EMERGENCY)
Facility: HOSPITAL | Age: 24
Discharge: HOME | End: 2024-01-27
Payer: MEDICAID

## 2024-01-27 ENCOUNTER — APPOINTMENT (OUTPATIENT)
Dept: CT IMAGING | Facility: HOSPITAL | Age: 24
End: 2024-01-27
Payer: MEDICAID

## 2024-01-27 VITALS
SYSTOLIC BLOOD PRESSURE: 139 MMHG | OXYGEN SATURATION: 100 % | WEIGHT: 208 LBS | HEART RATE: 92 BPM | RESPIRATION RATE: 18 BRPM | TEMPERATURE: 98.2 F | BODY MASS INDEX: 34.66 KG/M2 | DIASTOLIC BLOOD PRESSURE: 95 MMHG | HEIGHT: 65 IN

## 2024-01-27 DIAGNOSIS — R10.9 RIGHT FLANK PAIN, CHRONIC: Primary | ICD-10-CM

## 2024-01-27 DIAGNOSIS — G89.29 RIGHT FLANK PAIN, CHRONIC: Primary | ICD-10-CM

## 2024-01-27 LAB
ALBUMIN SERPL-MCNC: 4.4 G/DL (ref 3.5–5.2)
ALBUMIN/GLOB SERPL: 1.4 G/DL
ALP SERPL-CCNC: 118 U/L (ref 39–117)
ALT SERPL W P-5'-P-CCNC: 40 U/L (ref 1–41)
ANION GAP SERPL CALCULATED.3IONS-SCNC: 9 MMOL/L (ref 5–15)
AST SERPL-CCNC: 22 U/L (ref 1–40)
BASOPHILS # BLD AUTO: 0 10*3/MM3 (ref 0–0.2)
BASOPHILS NFR BLD AUTO: 0.7 % (ref 0–1.5)
BILIRUB SERPL-MCNC: 0.4 MG/DL (ref 0–1.2)
BILIRUB UR QL STRIP: NEGATIVE
BUN SERPL-MCNC: 11 MG/DL (ref 6–20)
BUN/CREAT SERPL: 11.8 (ref 7–25)
CALCIUM SPEC-SCNC: 9.5 MG/DL (ref 8.6–10.5)
CHLORIDE SERPL-SCNC: 105 MMOL/L (ref 98–107)
CLARITY UR: CLEAR
CO2 SERPL-SCNC: 27 MMOL/L (ref 22–29)
COLOR UR: YELLOW
CREAT SERPL-MCNC: 0.93 MG/DL (ref 0.76–1.27)
CRP SERPL-MCNC: 0.52 MG/DL (ref 0–0.5)
DEPRECATED RDW RBC AUTO: 48.6 FL (ref 37–54)
EGFRCR SERPLBLD CKD-EPI 2021: 118.3 ML/MIN/1.73
EOSINOPHIL # BLD AUTO: 0.1 10*3/MM3 (ref 0–0.4)
EOSINOPHIL NFR BLD AUTO: 2.1 % (ref 0.3–6.2)
ERYTHROCYTE [DISTWIDTH] IN BLOOD BY AUTOMATED COUNT: 16.1 % (ref 12.3–15.4)
ERYTHROCYTE [SEDIMENTATION RATE] IN BLOOD: 25 MM/HR (ref 0–15)
GLOBULIN UR ELPH-MCNC: 3.1 GM/DL
GLUCOSE SERPL-MCNC: 87 MG/DL (ref 65–99)
GLUCOSE UR STRIP-MCNC: NEGATIVE MG/DL
HCT VFR BLD AUTO: 37.1 % (ref 37.5–51)
HGB BLD-MCNC: 12.1 G/DL (ref 13–17.7)
HGB UR QL STRIP.AUTO: NEGATIVE
KETONES UR QL STRIP: NEGATIVE
LEUKOCYTE ESTERASE UR QL STRIP.AUTO: NEGATIVE
LYMPHOCYTES # BLD AUTO: 1.7 10*3/MM3 (ref 0.7–3.1)
LYMPHOCYTES NFR BLD AUTO: 30 % (ref 19.6–45.3)
MCH RBC QN AUTO: 26.5 PG (ref 26.6–33)
MCHC RBC AUTO-ENTMCNC: 32.5 G/DL (ref 31.5–35.7)
MCV RBC AUTO: 81.4 FL (ref 79–97)
MONOCYTES # BLD AUTO: 0.4 10*3/MM3 (ref 0.1–0.9)
MONOCYTES NFR BLD AUTO: 6.7 % (ref 5–12)
NEUTROPHILS NFR BLD AUTO: 3.5 10*3/MM3 (ref 1.7–7)
NEUTROPHILS NFR BLD AUTO: 60.5 % (ref 42.7–76)
NITRITE UR QL STRIP: NEGATIVE
NRBC BLD AUTO-RTO: 0 /100 WBC (ref 0–0.2)
PH UR STRIP.AUTO: 7.5 [PH] (ref 5–8)
PLATELET # BLD AUTO: 277 10*3/MM3 (ref 140–450)
PMV BLD AUTO: 6.9 FL (ref 6–12)
POTASSIUM SERPL-SCNC: 3.7 MMOL/L (ref 3.5–5.2)
PROT SERPL-MCNC: 7.5 G/DL (ref 6–8.5)
PROT UR QL STRIP: NEGATIVE
RBC # BLD AUTO: 4.55 10*6/MM3 (ref 4.14–5.8)
SODIUM SERPL-SCNC: 141 MMOL/L (ref 136–145)
SP GR UR STRIP: 1.01 (ref 1–1.03)
UROBILINOGEN UR QL STRIP: NORMAL
WBC NRBC COR # BLD AUTO: 5.8 10*3/MM3 (ref 3.4–10.8)

## 2024-01-27 PROCEDURE — 99285 EMERGENCY DEPT VISIT HI MDM: CPT

## 2024-01-27 PROCEDURE — 85025 COMPLETE CBC W/AUTO DIFF WBC: CPT | Performed by: NURSE PRACTITIONER

## 2024-01-27 PROCEDURE — 87040 BLOOD CULTURE FOR BACTERIA: CPT | Performed by: NURSE PRACTITIONER

## 2024-01-27 PROCEDURE — 25810000003 LACTATED RINGERS SOLUTION: Performed by: NURSE PRACTITIONER

## 2024-01-27 PROCEDURE — 25010000002 KETOROLAC TROMETHAMINE PER 15 MG: Performed by: NURSE PRACTITIONER

## 2024-01-27 PROCEDURE — 96374 THER/PROPH/DIAG INJ IV PUSH: CPT

## 2024-01-27 PROCEDURE — 96375 TX/PRO/DX INJ NEW DRUG ADDON: CPT

## 2024-01-27 PROCEDURE — 80053 COMPREHEN METABOLIC PANEL: CPT | Performed by: NURSE PRACTITIONER

## 2024-01-27 PROCEDURE — 36415 COLL VENOUS BLD VENIPUNCTURE: CPT

## 2024-01-27 PROCEDURE — 85652 RBC SED RATE AUTOMATED: CPT | Performed by: NURSE PRACTITIONER

## 2024-01-27 PROCEDURE — 25010000002 ONDANSETRON PER 1 MG: Performed by: NURSE PRACTITIONER

## 2024-01-27 PROCEDURE — 74177 CT ABD & PELVIS W/CONTRAST: CPT

## 2024-01-27 PROCEDURE — 86140 C-REACTIVE PROTEIN: CPT | Performed by: NURSE PRACTITIONER

## 2024-01-27 PROCEDURE — 25510000001 IOPAMIDOL PER 1 ML: Performed by: NURSE PRACTITIONER

## 2024-01-27 PROCEDURE — 81003 URINALYSIS AUTO W/O SCOPE: CPT | Performed by: NURSE PRACTITIONER

## 2024-01-27 RX ORDER — ONDANSETRON 2 MG/ML
4 INJECTION INTRAMUSCULAR; INTRAVENOUS ONCE
Status: COMPLETED | OUTPATIENT
Start: 2024-01-27 | End: 2024-01-27

## 2024-01-27 RX ORDER — KETOROLAC TROMETHAMINE 30 MG/ML
15 INJECTION, SOLUTION INTRAMUSCULAR; INTRAVENOUS ONCE
Status: COMPLETED | OUTPATIENT
Start: 2024-01-27 | End: 2024-01-27

## 2024-01-27 RX ORDER — SODIUM CHLORIDE 0.9 % (FLUSH) 0.9 %
10 SYRINGE (ML) INJECTION AS NEEDED
Status: DISCONTINUED | OUTPATIENT
Start: 2024-01-27 | End: 2024-01-27 | Stop reason: HOSPADM

## 2024-01-27 RX ADMIN — SODIUM CHLORIDE, POTASSIUM CHLORIDE, SODIUM LACTATE AND CALCIUM CHLORIDE 1000 ML: 600; 310; 30; 20 INJECTION, SOLUTION INTRAVENOUS at 11:57

## 2024-01-27 RX ADMIN — IOPAMIDOL 100 ML: 755 INJECTION, SOLUTION INTRAVENOUS at 13:42

## 2024-01-27 RX ADMIN — KETOROLAC TROMETHAMINE 15 MG: 30 INJECTION INTRAMUSCULAR; INTRAVENOUS at 11:57

## 2024-01-27 RX ADMIN — ONDANSETRON 4 MG: 2 INJECTION INTRAMUSCULAR; INTRAVENOUS at 11:57

## 2024-01-27 NOTE — DISCHARGE INSTRUCTIONS
Rest and please take alternating doses of ibuprofen and/or Tylenol as directed.  CAT scan obtained of your abdomen and pelvis did not show any acute abnormality.  Also blood work and urine are absolutely normal.

## 2024-02-01 LAB
BACTERIA SPEC AEROBE CULT: NORMAL
BACTERIA SPEC AEROBE CULT: NORMAL

## 2024-05-26 ENCOUNTER — HOSPITAL ENCOUNTER (OUTPATIENT)
Facility: HOSPITAL | Age: 24
Discharge: HOME | End: 2024-05-27
Attending: HOSPITALIST | Admitting: INTERNAL MEDICINE
Payer: MEDICAID

## 2024-05-26 ENCOUNTER — APPOINTMENT (OUTPATIENT)
Dept: CT IMAGING | Facility: HOSPITAL | Age: 24
End: 2024-05-26
Payer: MEDICAID

## 2024-05-26 DIAGNOSIS — K37 APPENDICITIS: ICD-10-CM

## 2024-05-26 DIAGNOSIS — R10.84 GENERALIZED ABDOMINAL PAIN: Primary | ICD-10-CM

## 2024-05-26 PROBLEM — F64.9 GENDER DYSPHORIA: Status: ACTIVE | Noted: 2024-05-26

## 2024-05-26 PROBLEM — D72.829 LEUKOCYTOSIS: Status: ACTIVE | Noted: 2024-05-26

## 2024-05-26 PROBLEM — F31.9 BIPOLAR 1 DISORDER: Status: ACTIVE | Noted: 2024-05-26

## 2024-05-26 PROBLEM — J45.909 ASTHMA: Status: ACTIVE | Noted: 2024-05-26

## 2024-05-26 PROBLEM — K58.9 IBS (IRRITABLE BOWEL SYNDROME): Status: ACTIVE | Noted: 2024-05-26

## 2024-05-26 PROBLEM — E66.9 OBESE: Status: ACTIVE | Noted: 2024-05-26

## 2024-05-26 PROBLEM — R10.9 ABDOMINAL PAIN: Status: ACTIVE | Noted: 2024-05-26

## 2024-05-26 PROBLEM — E78.5 HYPERLIPIDEMIA: Status: ACTIVE | Noted: 2024-05-26

## 2024-05-26 LAB
BASOPHILS # BLD AUTO: 0 10*3/MM3 (ref 0–0.2)
BASOPHILS NFR BLD AUTO: 0 % (ref 0–1.5)
DEPRECATED RDW RBC AUTO: 44.7 FL (ref 37–54)
EOSINOPHIL # BLD AUTO: 0 10*3/MM3 (ref 0–0.4)
EOSINOPHIL NFR BLD AUTO: 0 % (ref 0.3–6.2)
ERYTHROCYTE [DISTWIDTH] IN BLOOD BY AUTOMATED COUNT: 14.3 % (ref 12.3–15.4)
HCT VFR BLD AUTO: 35.6 % (ref 37.5–51)
HGB BLD-MCNC: 11.4 G/DL (ref 13–17.7)
IMM GRANULOCYTES # BLD AUTO: 0.02 10*3/MM3 (ref 0–0.05)
IMM GRANULOCYTES NFR BLD AUTO: 0.2 % (ref 0–0.5)
LYMPHOCYTES # BLD AUTO: 1.84 10*3/MM3 (ref 0.7–3.1)
LYMPHOCYTES NFR BLD AUTO: 19.4 % (ref 19.6–45.3)
MCH RBC QN AUTO: 27.5 PG (ref 26.6–33)
MCHC RBC AUTO-ENTMCNC: 32 G/DL (ref 31.5–35.7)
MCV RBC AUTO: 86 FL (ref 79–97)
MONOCYTES # BLD AUTO: 0.46 10*3/MM3 (ref 0.1–0.9)
MONOCYTES NFR BLD AUTO: 4.8 % (ref 5–12)
NEUTROPHILS NFR BLD AUTO: 7.17 10*3/MM3 (ref 1.7–7)
NEUTROPHILS NFR BLD AUTO: 75.6 % (ref 42.7–76)
NRBC BLD AUTO-RTO: 0 /100 WBC (ref 0–0.2)
PLATELET # BLD AUTO: 283 10*3/MM3 (ref 140–450)
PMV BLD AUTO: 9.5 FL (ref 6–12)
RBC # BLD AUTO: 4.14 10*6/MM3 (ref 4.14–5.8)
WBC NRBC COR # BLD AUTO: 9.49 10*3/MM3 (ref 3.4–10.8)

## 2024-05-26 PROCEDURE — 83605 ASSAY OF LACTIC ACID: CPT | Performed by: NURSE PRACTITIONER

## 2024-05-26 PROCEDURE — 25010000002 HYDROMORPHONE 1 MG/ML SOLUTION: Performed by: SURGERY

## 2024-05-26 PROCEDURE — 96374 THER/PROPH/DIAG INJ IV PUSH: CPT

## 2024-05-26 PROCEDURE — G0378 HOSPITAL OBSERVATION PER HR: HCPCS

## 2024-05-26 PROCEDURE — 96375 TX/PRO/DX INJ NEW DRUG ADDON: CPT

## 2024-05-26 PROCEDURE — 25010000002 HYDROMORPHONE 1 MG/ML SOLUTION: Performed by: NURSE PRACTITIONER

## 2024-05-26 PROCEDURE — 25510000001 IOPAMIDOL PER 1 ML: Performed by: INTERNAL MEDICINE

## 2024-05-26 PROCEDURE — 85025 COMPLETE CBC W/AUTO DIFF WBC: CPT | Performed by: NURSE PRACTITIONER

## 2024-05-26 PROCEDURE — 74177 CT ABD & PELVIS W/CONTRAST: CPT

## 2024-05-26 PROCEDURE — 25010000002 ONDANSETRON PER 1 MG: Performed by: SURGERY

## 2024-05-26 PROCEDURE — 80048 BASIC METABOLIC PNL TOTAL CA: CPT | Performed by: NURSE PRACTITIONER

## 2024-05-26 PROCEDURE — 25810000003 SODIUM CHLORIDE 0.9 % SOLUTION: Performed by: NURSE PRACTITIONER

## 2024-05-26 PROCEDURE — 25810000003 SODIUM CHLORIDE 0.9 % SOLUTION: Performed by: SURGERY

## 2024-05-26 PROCEDURE — 25010000002 ONDANSETRON PER 1 MG: Performed by: NURSE PRACTITIONER

## 2024-05-26 RX ORDER — PROPRANOLOL HYDROCHLORIDE 10 MG/1
10 TABLET ORAL DAILY
Status: DISCONTINUED | OUTPATIENT
Start: 2024-05-27 | End: 2024-05-27 | Stop reason: HOSPADM

## 2024-05-26 RX ORDER — PROPRANOLOL HYDROCHLORIDE 10 MG/1
10 TABLET ORAL 2 TIMES DAILY
Status: DISCONTINUED | OUTPATIENT
Start: 2024-05-26 | End: 2024-05-26

## 2024-05-26 RX ORDER — DULOXETIN HYDROCHLORIDE 20 MG/1
20 CAPSULE, DELAYED RELEASE ORAL DAILY
Status: DISCONTINUED | OUTPATIENT
Start: 2024-05-27 | End: 2024-05-27 | Stop reason: HOSPADM

## 2024-05-26 RX ORDER — CYCLOBENZAPRINE HCL 10 MG
10 TABLET ORAL NIGHTLY
Status: DISCONTINUED | OUTPATIENT
Start: 2024-05-26 | End: 2024-05-27 | Stop reason: HOSPADM

## 2024-05-26 RX ORDER — MELATONIN
2000 DAILY
Status: DISCONTINUED | OUTPATIENT
Start: 2024-05-27 | End: 2024-05-27 | Stop reason: HOSPADM

## 2024-05-26 RX ORDER — CLONAZEPAM 0.5 MG/1
0.5 TABLET, ORALLY DISINTEGRATING ORAL 2 TIMES DAILY
Status: DISCONTINUED | OUTPATIENT
Start: 2024-05-26 | End: 2024-05-27 | Stop reason: HOSPADM

## 2024-05-26 RX ORDER — DULOXETIN HYDROCHLORIDE 30 MG/1
30 CAPSULE, DELAYED RELEASE ORAL DAILY
Status: DISCONTINUED | OUTPATIENT
Start: 2024-05-27 | End: 2024-05-27 | Stop reason: HOSPADM

## 2024-05-26 RX ORDER — DULOXETIN HYDROCHLORIDE 20 MG/1
20 CAPSULE, DELAYED RELEASE ORAL DAILY
Status: DISCONTINUED | OUTPATIENT
Start: 2024-05-27 | End: 2024-05-26

## 2024-05-26 RX ORDER — CETIRIZINE HYDROCHLORIDE 10 MG/1
10 TABLET ORAL DAILY PRN
Status: DISCONTINUED | OUTPATIENT
Start: 2024-05-26 | End: 2024-05-27 | Stop reason: HOSPADM

## 2024-05-26 RX ORDER — HYDROXYZINE HYDROCHLORIDE 25 MG/1
25 TABLET, FILM COATED ORAL EVERY 6 HOURS PRN
Status: DISCONTINUED | OUTPATIENT
Start: 2024-05-26 | End: 2024-05-27 | Stop reason: HOSPADM

## 2024-05-26 RX ORDER — SODIUM CHLORIDE 9 MG/ML
125 INJECTION, SOLUTION INTRAVENOUS CONTINUOUS
Status: DISCONTINUED | OUTPATIENT
Start: 2024-05-26 | End: 2024-05-27 | Stop reason: HOSPADM

## 2024-05-26 RX ORDER — GABAPENTIN 300 MG/1
300 CAPSULE ORAL 3 TIMES DAILY
Status: DISCONTINUED | OUTPATIENT
Start: 2024-05-26 | End: 2024-05-27 | Stop reason: HOSPADM

## 2024-05-26 RX ORDER — PROPRANOLOL HYDROCHLORIDE 20 MG/1
20 TABLET ORAL NIGHTLY
Status: DISCONTINUED | OUTPATIENT
Start: 2024-05-27 | End: 2024-05-27 | Stop reason: HOSPADM

## 2024-05-26 RX ORDER — ONDANSETRON 2 MG/ML
4 INJECTION INTRAMUSCULAR; INTRAVENOUS EVERY 6 HOURS PRN
Status: DISCONTINUED | OUTPATIENT
Start: 2024-05-26 | End: 2024-05-27 | Stop reason: HOSPADM

## 2024-05-26 RX ORDER — PANTOPRAZOLE SODIUM 40 MG/10ML
40 INJECTION, POWDER, LYOPHILIZED, FOR SOLUTION INTRAVENOUS
Status: DISCONTINUED | OUTPATIENT
Start: 2024-05-27 | End: 2024-05-27 | Stop reason: HOSPADM

## 2024-05-26 RX ORDER — ALBUTEROL SULFATE 2.5 MG/3ML
2.5 SOLUTION RESPIRATORY (INHALATION) EVERY 6 HOURS PRN
Status: DISCONTINUED | OUTPATIENT
Start: 2024-05-26 | End: 2024-05-27 | Stop reason: HOSPADM

## 2024-05-26 RX ORDER — DICYCLOMINE HYDROCHLORIDE 10 MG/1
10 CAPSULE ORAL
Status: DISCONTINUED | OUTPATIENT
Start: 2024-05-27 | End: 2024-05-27 | Stop reason: HOSPADM

## 2024-05-26 RX ADMIN — DULOXETINE HYDROCHLORIDE 30 MG: 30 CAPSULE, DELAYED RELEASE ORAL at 23:17

## 2024-05-26 RX ADMIN — CLONAZEPAM 0.5 MG: 0.5 TABLET, ORALLY DISINTEGRATING ORAL at 23:17

## 2024-05-26 RX ADMIN — SODIUM CHLORIDE 125 ML/HR: 9 INJECTION, SOLUTION INTRAVENOUS at 22:27

## 2024-05-26 RX ADMIN — HYDROXYZINE HYDROCHLORIDE 25 MG: 25 TABLET, FILM COATED ORAL at 23:17

## 2024-05-26 RX ADMIN — GABAPENTIN 300 MG: 300 CAPSULE ORAL at 23:17

## 2024-05-26 RX ADMIN — IOPAMIDOL 100 ML: 755 INJECTION, SOLUTION INTRAVENOUS at 23:46

## 2024-05-26 RX ADMIN — PROPRANOLOL HYDROCHLORIDE 20 MG: 20 TABLET ORAL at 23:17

## 2024-05-26 RX ADMIN — HYDROMORPHONE HYDROCHLORIDE 0.5 MG: 1 INJECTION, SOLUTION INTRAMUSCULAR; INTRAVENOUS; SUBCUTANEOUS at 22:31

## 2024-05-26 RX ADMIN — ONDANSETRON 4 MG: 2 INJECTION INTRAMUSCULAR; INTRAVENOUS at 22:27

## 2024-05-26 RX ADMIN — CYCLOBENZAPRINE 10 MG: 10 TABLET, FILM COATED ORAL at 23:17

## 2024-05-27 ENCOUNTER — ANESTHESIA EVENT (OUTPATIENT)
Dept: PERIOP | Facility: HOSPITAL | Age: 24
End: 2024-05-27
Payer: MEDICAID

## 2024-05-27 ENCOUNTER — ANESTHESIA (OUTPATIENT)
Dept: PERIOP | Facility: HOSPITAL | Age: 24
End: 2024-05-27
Payer: MEDICAID

## 2024-05-27 VITALS
HEIGHT: 64 IN | OXYGEN SATURATION: 93 % | TEMPERATURE: 98.2 F | HEART RATE: 103 BPM | DIASTOLIC BLOOD PRESSURE: 80 MMHG | SYSTOLIC BLOOD PRESSURE: 119 MMHG | WEIGHT: 230.16 LBS | RESPIRATION RATE: 17 BRPM | BODY MASS INDEX: 39.29 KG/M2

## 2024-05-27 LAB
ANION GAP SERPL CALCULATED.3IONS-SCNC: 10.4 MMOL/L (ref 5–15)
BUN SERPL-MCNC: 7 MG/DL (ref 6–20)
BUN/CREAT SERPL: 7.2 (ref 7–25)
CALCIUM SPEC-SCNC: 9 MG/DL (ref 8.6–10.5)
CHLORIDE SERPL-SCNC: 110 MMOL/L (ref 98–107)
CO2 SERPL-SCNC: 21.6 MMOL/L (ref 22–29)
CREAT SERPL-MCNC: 0.97 MG/DL (ref 0.76–1.27)
D-LACTATE SERPL-SCNC: 1.3 MMOL/L (ref 0.5–2)
EGFRCR SERPLBLD CKD-EPI 2021: 111.8 ML/MIN/1.73
GLUCOSE SERPL-MCNC: 121 MG/DL (ref 65–99)
POTASSIUM SERPL-SCNC: 3.4 MMOL/L (ref 3.5–5.2)
POTASSIUM SERPL-SCNC: 3.5 MMOL/L (ref 3.5–5.2)
SODIUM SERPL-SCNC: 142 MMOL/L (ref 136–145)

## 2024-05-27 PROCEDURE — C1889 IMPLANT/INSERT DEVICE, NOC: HCPCS | Performed by: SURGERY

## 2024-05-27 PROCEDURE — 99214 OFFICE O/P EST MOD 30 MIN: CPT | Performed by: SURGERY

## 2024-05-27 PROCEDURE — 84132 ASSAY OF SERUM POTASSIUM: CPT | Performed by: NURSE PRACTITIONER

## 2024-05-27 PROCEDURE — 25810000003 SODIUM CHLORIDE 0.9 % SOLUTION: Performed by: ANESTHESIOLOGY

## 2024-05-27 PROCEDURE — 88342 IMHCHEM/IMCYTCHM 1ST ANTB: CPT | Performed by: SURGERY

## 2024-05-27 PROCEDURE — 25010000002 BUPIVACAINE (PF) 0.25 % SOLUTION: Performed by: SURGERY

## 2024-05-27 PROCEDURE — 93005 ELECTROCARDIOGRAM TRACING: CPT | Performed by: NURSE PRACTITIONER

## 2024-05-27 PROCEDURE — 88341 IMHCHEM/IMCYTCHM EA ADD ANTB: CPT | Performed by: SURGERY

## 2024-05-27 PROCEDURE — 25010000002 HYDROMORPHONE 1 MG/ML SOLUTION: Performed by: NURSE PRACTITIONER

## 2024-05-27 PROCEDURE — 25010000002 PROPOFOL 200 MG/20ML EMULSION: Performed by: ANESTHESIOLOGY

## 2024-05-27 PROCEDURE — 96376 TX/PRO/DX INJ SAME DRUG ADON: CPT

## 2024-05-27 PROCEDURE — G0378 HOSPITAL OBSERVATION PER HR: HCPCS

## 2024-05-27 PROCEDURE — 25010000002 KETOROLAC TROMETHAMINE PER 15 MG: Performed by: ANESTHESIOLOGY

## 2024-05-27 PROCEDURE — 25010000002 PIPERACILLIN SOD-TAZOBACTAM PER 1 G: Performed by: NURSE PRACTITIONER

## 2024-05-27 PROCEDURE — 96375 TX/PRO/DX INJ NEW DRUG ADDON: CPT

## 2024-05-27 PROCEDURE — 88360 TUMOR IMMUNOHISTOCHEM/MANUAL: CPT | Performed by: SURGERY

## 2024-05-27 PROCEDURE — 25010000002 ONDANSETRON PER 1 MG: Performed by: ANESTHESIOLOGY

## 2024-05-27 PROCEDURE — 25010000002 FENTANYL CITRATE (PF) 100 MCG/2ML SOLUTION: Performed by: ANESTHESIOLOGY

## 2024-05-27 PROCEDURE — 25010000002 POTASSIUM CHLORIDE 10 MEQ/100ML SOLUTION: Performed by: NURSE PRACTITIONER

## 2024-05-27 PROCEDURE — 93010 ELECTROCARDIOGRAM REPORT: CPT | Performed by: INTERNAL MEDICINE

## 2024-05-27 PROCEDURE — 44970 LAPAROSCOPY APPENDECTOMY: CPT | Performed by: SURGERY

## 2024-05-27 PROCEDURE — 25010000002 DEXAMETHASONE PER 1 MG: Performed by: ANESTHESIOLOGY

## 2024-05-27 PROCEDURE — 25010000002 HYDROMORPHONE 1 MG/ML SOLUTION: Performed by: ANESTHESIOLOGY

## 2024-05-27 PROCEDURE — 88304 TISSUE EXAM BY PATHOLOGIST: CPT | Performed by: SURGERY

## 2024-05-27 PROCEDURE — 25010000002 HYDROMORPHONE 1 MG/ML SOLUTION: Performed by: SURGERY

## 2024-05-27 PROCEDURE — 25010000002 SUGAMMADEX 200 MG/2ML SOLUTION: Performed by: ANESTHESIOLOGY

## 2024-05-27 DEVICE — CURVED TIP INTELLIGENT RELOAD AND INTRODUCER
Type: IMPLANTABLE DEVICE | Site: ABDOMEN | Status: FUNCTIONAL
Brand: TRI-STAPLE 2.0

## 2024-05-27 DEVICE — ARTICULATION RELOAD WITH TRI-STAPLE TECHNOLOGY
Type: IMPLANTABLE DEVICE | Site: ABDOMEN | Status: FUNCTIONAL
Brand: ENDO GIA

## 2024-05-27 RX ORDER — ONDANSETRON 2 MG/ML
4 INJECTION INTRAMUSCULAR; INTRAVENOUS ONCE AS NEEDED
Status: COMPLETED | OUTPATIENT
Start: 2024-05-27 | End: 2024-05-27

## 2024-05-27 RX ORDER — SODIUM CHLORIDE 9 MG/ML
INJECTION, SOLUTION INTRAVENOUS CONTINUOUS PRN
Status: DISCONTINUED | OUTPATIENT
Start: 2024-05-27 | End: 2024-05-27 | Stop reason: SURG

## 2024-05-27 RX ORDER — DIPHENHYDRAMINE HYDROCHLORIDE 50 MG/ML
12.5 INJECTION INTRAMUSCULAR; INTRAVENOUS
Status: DISCONTINUED | OUTPATIENT
Start: 2024-05-27 | End: 2024-05-27

## 2024-05-27 RX ORDER — KETOROLAC TROMETHAMINE 30 MG/ML
INJECTION, SOLUTION INTRAMUSCULAR; INTRAVENOUS AS NEEDED
Status: DISCONTINUED | OUTPATIENT
Start: 2024-05-27 | End: 2024-05-27 | Stop reason: SURG

## 2024-05-27 RX ORDER — FENTANYL CITRATE 50 UG/ML
INJECTION, SOLUTION INTRAMUSCULAR; INTRAVENOUS AS NEEDED
Status: DISCONTINUED | OUTPATIENT
Start: 2024-05-27 | End: 2024-05-27 | Stop reason: SURG

## 2024-05-27 RX ORDER — LABETALOL HYDROCHLORIDE 5 MG/ML
5 INJECTION, SOLUTION INTRAVENOUS
Status: DISCONTINUED | OUTPATIENT
Start: 2024-05-27 | End: 2024-05-27

## 2024-05-27 RX ORDER — DIPHENHYDRAMINE HYDROCHLORIDE 50 MG/ML
12.5 INJECTION INTRAMUSCULAR; INTRAVENOUS ONCE AS NEEDED
Status: DISCONTINUED | OUTPATIENT
Start: 2024-05-27 | End: 2024-05-27

## 2024-05-27 RX ORDER — ROCURONIUM BROMIDE 10 MG/ML
INJECTION, SOLUTION INTRAVENOUS AS NEEDED
Status: DISCONTINUED | OUTPATIENT
Start: 2024-05-27 | End: 2024-05-27 | Stop reason: SURG

## 2024-05-27 RX ORDER — DEXAMETHASONE SODIUM PHOSPHATE 4 MG/ML
INJECTION, SOLUTION INTRA-ARTICULAR; INTRALESIONAL; INTRAMUSCULAR; INTRAVENOUS; SOFT TISSUE AS NEEDED
Status: DISCONTINUED | OUTPATIENT
Start: 2024-05-27 | End: 2024-05-27 | Stop reason: SURG

## 2024-05-27 RX ORDER — HYDRALAZINE HYDROCHLORIDE 20 MG/ML
5 INJECTION INTRAMUSCULAR; INTRAVENOUS
Status: DISCONTINUED | OUTPATIENT
Start: 2024-05-27 | End: 2024-05-27

## 2024-05-27 RX ORDER — ONDANSETRON 2 MG/ML
INJECTION INTRAMUSCULAR; INTRAVENOUS AS NEEDED
Status: DISCONTINUED | OUTPATIENT
Start: 2024-05-27 | End: 2024-05-27 | Stop reason: SURG

## 2024-05-27 RX ORDER — FLUMAZENIL 0.1 MG/ML
0.1 INJECTION INTRAVENOUS AS NEEDED
Status: DISCONTINUED | OUTPATIENT
Start: 2024-05-27 | End: 2024-05-27

## 2024-05-27 RX ORDER — ACETAMINOPHEN AND CODEINE PHOSPHATE 300; 30 MG/1; MG/1
1 TABLET ORAL EVERY 6 HOURS PRN
Qty: 20 TABLET | Refills: 0 | Status: SHIPPED | OUTPATIENT
Start: 2024-05-27

## 2024-05-27 RX ORDER — EPHEDRINE SULFATE 5 MG/ML
5 INJECTION INTRAVENOUS ONCE AS NEEDED
Status: DISCONTINUED | OUTPATIENT
Start: 2024-05-27 | End: 2024-05-27

## 2024-05-27 RX ORDER — OXYCODONE HYDROCHLORIDE 5 MG/1
10 TABLET ORAL EVERY 4 HOURS PRN
Status: DISCONTINUED | OUTPATIENT
Start: 2024-05-27 | End: 2024-05-27

## 2024-05-27 RX ORDER — BUPIVACAINE HYDROCHLORIDE 2.5 MG/ML
INJECTION, SOLUTION EPIDURAL; INFILTRATION; INTRACAUDAL AS NEEDED
Status: DISCONTINUED | OUTPATIENT
Start: 2024-05-27 | End: 2024-05-27 | Stop reason: HOSPADM

## 2024-05-27 RX ORDER — POTASSIUM CHLORIDE 7.45 MG/ML
10 INJECTION INTRAVENOUS ONCE
Status: COMPLETED | OUTPATIENT
Start: 2024-05-27 | End: 2024-05-27

## 2024-05-27 RX ORDER — NALOXONE HCL 0.4 MG/ML
0.4 VIAL (ML) INJECTION AS NEEDED
Status: DISCONTINUED | OUTPATIENT
Start: 2024-05-27 | End: 2024-05-27

## 2024-05-27 RX ORDER — AMOXICILLIN AND CLAVULANATE POTASSIUM 875; 125 MG/1; MG/1
1 TABLET, FILM COATED ORAL 2 TIMES DAILY
Qty: 6 TABLET | Refills: 0 | Status: SHIPPED | OUTPATIENT
Start: 2024-05-27 | End: 2024-05-30

## 2024-05-27 RX ORDER — PROPOFOL 10 MG/ML
INJECTION, EMULSION INTRAVENOUS AS NEEDED
Status: DISCONTINUED | OUTPATIENT
Start: 2024-05-27 | End: 2024-05-27 | Stop reason: SURG

## 2024-05-27 RX ORDER — IPRATROPIUM BROMIDE AND ALBUTEROL SULFATE 2.5; .5 MG/3ML; MG/3ML
3 SOLUTION RESPIRATORY (INHALATION) ONCE AS NEEDED
Status: COMPLETED | OUTPATIENT
Start: 2024-05-27 | End: 2024-05-27

## 2024-05-27 RX ORDER — OXYCODONE HYDROCHLORIDE 5 MG/1
5 TABLET ORAL ONCE AS NEEDED
Status: DISCONTINUED | OUTPATIENT
Start: 2024-05-27 | End: 2024-05-27

## 2024-05-27 RX ORDER — HYDROCODONE BITARTRATE AND ACETAMINOPHEN 5; 325 MG/1; MG/1
1 TABLET ORAL EVERY 4 HOURS PRN
Status: DISCONTINUED | OUTPATIENT
Start: 2024-05-27 | End: 2024-05-27 | Stop reason: HOSPADM

## 2024-05-27 RX ADMIN — SODIUM CHLORIDE: 9 INJECTION, SOLUTION INTRAVENOUS at 10:12

## 2024-05-27 RX ADMIN — PIPERACILLIN AND TAZOBACTAM 3.38 G: 3; .375 INJECTION, POWDER, FOR SOLUTION INTRAVENOUS at 02:36

## 2024-05-27 RX ADMIN — ONDANSETRON 4 MG: 2 INJECTION INTRAMUSCULAR; INTRAVENOUS at 11:43

## 2024-05-27 RX ADMIN — HYDROMORPHONE HYDROCHLORIDE 0.5 MG: 1 INJECTION, SOLUTION INTRAMUSCULAR; INTRAVENOUS; SUBCUTANEOUS at 02:37

## 2024-05-27 RX ADMIN — SUGAMMADEX 200 MG: 100 INJECTION, SOLUTION INTRAVENOUS at 11:00

## 2024-05-27 RX ADMIN — KETOROLAC TROMETHAMINE 30 MG: 30 INJECTION, SOLUTION INTRAMUSCULAR at 10:58

## 2024-05-27 RX ADMIN — FENTANYL CITRATE 100 MCG: 50 INJECTION, SOLUTION INTRAMUSCULAR; INTRAVENOUS at 10:14

## 2024-05-27 RX ADMIN — ROCURONIUM BROMIDE 50 MG: 10 INJECTION, SOLUTION INTRAVENOUS at 10:18

## 2024-05-27 RX ADMIN — HYDROMORPHONE HYDROCHLORIDE 0.5 MG: 1 INJECTION, SOLUTION INTRAMUSCULAR; INTRAVENOUS; SUBCUTANEOUS at 06:08

## 2024-05-27 RX ADMIN — DEXAMETHASONE SODIUM PHOSPHATE 4 MG: 4 INJECTION, SOLUTION INTRAMUSCULAR; INTRAVENOUS at 10:23

## 2024-05-27 RX ADMIN — ONDANSETRON 4 MG: 2 INJECTION INTRAMUSCULAR; INTRAVENOUS at 10:54

## 2024-05-27 RX ADMIN — SODIUM CHLORIDE: 9 INJECTION, SOLUTION INTRAVENOUS at 10:59

## 2024-05-27 RX ADMIN — POTASSIUM CHLORIDE 10 MEQ: 7.46 INJECTION, SOLUTION INTRAVENOUS at 02:35

## 2024-05-27 RX ADMIN — PANTOPRAZOLE SODIUM 40 MG: 40 INJECTION, POWDER, FOR SOLUTION INTRAVENOUS at 06:09

## 2024-05-27 RX ADMIN — PIPERACILLIN AND TAZOBACTAM 3.38 G: 3; .375 INJECTION, POWDER, FOR SOLUTION INTRAVENOUS at 09:43

## 2024-05-27 RX ADMIN — HYDROMORPHONE HYDROCHLORIDE 1 MG: 1 INJECTION, SOLUTION INTRAMUSCULAR; INTRAVENOUS; SUBCUTANEOUS at 10:27

## 2024-05-27 RX ADMIN — IPRATROPIUM BROMIDE AND ALBUTEROL SULFATE 3 ML: .5; 3 SOLUTION RESPIRATORY (INHALATION) at 11:31

## 2024-05-27 RX ADMIN — PROPOFOL 200 MG: 10 INJECTION, EMULSION INTRAVENOUS at 10:18

## 2024-05-27 NOTE — DISCHARGE SUMMARY
[facility] Medicine Services  Discharge Summary    Date of Service: 24  Patient Name: Damian Casillas  : 2000  MRN: [id number]    Date of Admission: 2024  Date of Discharge:  24  Primary Care Physician: Jackie Uriarte      Presenting Problem:   Abdominal pain [R10.9]    Active and Resolved Hospital Problems:  Active Hospital Problems    Diagnosis POA    **Abdominal pain [R10.9] Yes    Leukocytosis [D72.829] Yes    Gender dysphoria [F64.9] Yes    Asthma [J45.909] Unknown    Bipolar 1 disorder [F31.9] Yes    Obese [E66.9] Yes    IBS (irritable bowel syndrome) [K58.9] Yes    Hyperlipidemia [E78.5] Yes      Resolved Hospital Problems   No resolved problems to display.         Hospital Course     HPI:Damian Casillas is a 24 y.o. adult born female but identifies as male with a CMH of ADHD, asthma, autism, bipolar 1 disorder, depression, anxiety, dyslipidemia, obesity, overactive bladder, PTSD, gender dysphoria has been on testosterone for 2 years scheduled for breast removal surgery in August and hysterectomy for female to male transition who presented to [facility] upon transfer from [facility] emergency department on 2024 where he presented with complaints of right lower and mid abdominal pain.  He reports he has IBS and has chronic pain however this intensified about 8 AM this morning after a bowel movement.  He reports he took an Imodium and a  laxative suppository this morning.  He reports a bowel movement was loose .  He reports frequent bloody stool and has had a colonoscopy in the past was told he had IBS.  He also reports mucus in the stool.  He reports subjective fever but is afebrile here.  He reported nausea at home today and forced himself to vomit without hematemesis      .  He reports urinary frequency which is not uncommon for him but denies any dysuria.   Labs from outlying facility show urine drug screen positive for cannabinoids and tricyclic  antidepressants.  Lipase was 111, lactic acid 2.3, urinalysis showed negative nitrites trace leukocytes 6-10 WBCs 11-20 epithelial cells moderate bacteria.  Urine pregnancy was negative.  Labs showed an ALT of 70 AST of 45 albumin 5.1, WBC 13.8 calcium 10.7.  CT scanner at [facility] ED was reported as inoperable ,  therefore no CT was done.  Appendicitis was in the differential at outlying facility and general surgery reportedly consulted.  He was given a one-time dose of IV Zosyn in the emergency department.  Repeat CBC CMP, lactic and CT abdomen pelvis with contrast has been ordered and is pending.Patient will be admitted foir further evaluation and treatment.  Patient         Hospital Course:    Active and Resolved Problems     Acute appendicitis s/p laparoscopic appendectomy  Gender dysphoria  Anxiety depression/bipolar  Morbid obesity  History of irritable bowel syndrome  Hyperlipidemia  History of autism  Fibromyalgia  Acid reflux     Suggestion:    5/27-patient underwent surgery.  Patient was to be discharged home.  Will discharge patient home once patient is eating and drinking okay with surgery     5/27-at this time I will continue antibiotic.  Surgeries been consulted.  Keep NPO.         Day of Discharge     Vital Signs:  Temp:  [97.2 °F (36.2 °C)-98.6 °F (37 °C)] 98.2 °F (36.8 °C)  Heart Rate:  [] 95  Resp:  [13-24] 17  BP: (111-130)/(58-84) 119/80  Flow (L/min):  [2-4] 3    Physical Exam:  General Appearance:    Alert, cooperative, in no acute distress   Head:    Normocephalic, without obvious abnormality, atraumatic   Eyes:            conjunctivae and sclerae normal, no   icterus, no pallor, corneas  clear, PERRLA   Neck:   No adenopathy, supple, trachea midline, no thyromegaly, no   carotid bruit, no JVD   Lungs:     Clear to auscultation,respirations regular, even and                  unlabored    Heart:    Regular rhythm and normal rate, normal S1 and S2, no            murmur, no gallop, no  rub, no click   Abdomen:     Normal bowel sounds, no masses, no organomegaly, soft        non-tender, non-distended, no guarding, no rebound                No tenderness   Extremities:   Moves all extremities well, no edema, no cyanosis, no             redness   Lymph nodes:   No palpable adenopathy   Neurologic:   Cranial nerves 2 - 12 grossly intact, sensation intact, DTR       present and equal bilaterally          Pertinent  and/or Most Recent Results     LAB RESULTS:      Lab 05/26/24  2303   WBC 9.49   HEMOGLOBIN 11.4*   HEMATOCRIT 35.6*   PLATELETS 283   NEUTROS ABS 7.17*   IMMATURE GRANS (ABS) 0.02   LYMPHS ABS 1.84   MONOS ABS 0.46   EOS ABS 0.00   MCV 86.0   LACTATE 1.3         Lab 05/27/24  0615 05/26/24  2303   SODIUM  --  142   POTASSIUM 3.5 3.4*   CHLORIDE  --  110*   CO2  --  21.6*   ANION GAP  --  10.4   BUN  --  7   CREATININE  --  0.97   EGFR  --  111.8   GLUCOSE  --  121*   CALCIUM  --  9.0                         Brief Urine Lab Results  (Last result in the past 365 days)        Color   Clarity   Blood   Leuk Est   Nitrite   Protein   CREAT   Urine HCG        01/27/24 1232 Yellow   Clear   Negative   Negative   Negative   Negative                 Microbiology Results (last 10 days)       ** No results found for the last 240 hours. **            CT Abdomen Pelvis With Contrast    Result Date: 5/27/2024  Impression: Impression: There are findings that would suggest early acute appendicitis. Electronically Signed: Thad Brennan MD  5/27/2024 12:20 AM EDT  Workstation ID: [id number]                 Labs Pending at Discharge:  Pending Labs       Order Current Status    Tissue Pathology Exam Collected (05/27/24 1004)            Procedures Performed  Procedure(s):  APPENDECTOMY LAPAROSCOPIC         Consults:   Consults       Date and Time Order Name Status Description    5/26/2024  9:56 PM Inpatient General Surgery Consult                Discharge Details        Discharge Medications        New  "Medications        Instructions Start Date   acetaminophen-codeine 300-30 MG per tablet  Commonly known as: TYLENOL with CODEINE #3   1 tablet, Oral, Every 6 Hours PRN      amoxicillin-clavulanate 875-125 MG per tablet  Commonly known as: AUGMENTIN   1 tablet, Oral, 2 Times Daily             Continue These Medications        Instructions Start Date   albuterol sulfate  (90 Base) MCG/ACT inhaler  Commonly known as: PROVENTIL HFA;VENTOLIN HFA;PROAIR HFA   Inhale 1-2 puffs every 4-6 hours as needed for shortness of breath      APPLE CIDER VINEGAR PLUS PO   Oral      B-D 3CC LUER-KEYLA SYR 09FM9-5/2 18G X 1-1/2\" 3 ML misc  Generic drug: Syringe/Needle (Disp)   USE TO DRAW UP MEDICATION FOR WEEKLY INJECTION      B-D 3CC LUER-KEYLA SYR 25GX1\" 25G X 1\" 3 ML misc  Generic drug: Syringe/Needle (Disp)   USE FOR TESTOSTERONE INJECTION WEEKLY AS DIRECTED      BD Hypodermic Needle 18G X 1\" misc  Generic drug: Needle (Disp)       BD Syringe Slip Tip 25G X 5/8\" 1 ML misc  Generic drug: Tuberculin Syringe       Caplyta 42 MG capsule  Generic drug: Lumateperone Tosylate   1 capsule, Oral, Daily      cetirizine 10 MG tablet  Commonly known as: zyrTEC   10 mg, Oral, As Needed      Cholecalciferol 50 MCG (2000 UT) capsule   3 capsules, Oral, Every Morning      clonazePAM 0.5 MG disintegrating tablet  Commonly known as: KlonoPIN   1 tablet, Oral, 2 Times Daily      CVS B12 GUMMIES PO   Oral      cyclobenzaprine 10 MG tablet  Commonly known as: FLEXERIL   10 mg, Oral, Every Night at Bedtime      dicyclomine 10 MG capsule  Commonly known as: BENTYL   10 mg, Oral, 4 Times Daily Before Meals & Nightly      DULoxetine 20 MG capsule  Commonly known as: CYMBALTA   1 capsule, Oral, Daily, Pt also takes 30 mg at hs      gabapentin 300 MG capsule  Commonly known as: NEURONTIN   Take 1 capsule by mouth 3 (Three) Times a Day.      hydrOXYzine 25 MG tablet  Commonly known as: ATARAX       medroxyPROGESTERone 150 MG/ML injection  Commonly known " as: DEPO-PROVERA   150 mg, Intramuscular, Every 3 Months      omega-3 acid ethyl esters 1 g capsule  Commonly known as: LOVAZA   1 capsule, Oral, Every 12 Hours Scheduled      omeprazole 40 MG capsule  Commonly known as: priLOSEC   40 mg, Oral, 2 Times Daily      Premarin 0.625 MG/GM vaginal cream  Generic drug: Estrogens Conjugated   INSERT 0.5 APPLICATORFUL BY VAGINAL ROUTE EVERY DAY. CYCLE 3 WEEKS ON 1 WEEK OFF      propranolol 10 MG tablet  Commonly known as: INDERAL   1 tablet, Oral, 2 Times Daily      Testosterone Enanthate 200 MG/ML solution   INJECT 0.25ML IN THE MUSCLE EVERY WEEK               Allergies   Allergen Reactions    Cariprazine Nausea And Vomiting, Palpitations and Shortness Of Breath    Fluoxetine Hcl Other (See Comments)    Lamictal [Lamotrigine] Nausea Only    Latex Unknown - Low Severity    Fluoxetine Rash    Testosterone Cypionate Itching, Rash and Swelling         Discharge Disposition:   Home or Self Care    Diet:  Hospital:No active diet order        Discharge Activity:   Activity Instructions       Driving Restrictions      Type of Restriction: Driving    Driving Restrictions: No Driving While Taking Narcotics    Lifting Restrictions      Type of Restriction: Lifting    Lifting Restrictions: Other    Explain Lifing Restrictions: No lifting more than 15 lbs for 2 weeks. Or otherwise specified the day of surgery.    Other Activity Instructions      Activity Instructions: May shower in 24 hours. Do not tub soak incision or swim for at least 2 weeks.              CODE STATUS:  Code Status and Medical Interventions:   Ordered at: 05/26/24 2155     Code Status (Patient has no pulse and is not breathing):    CPR (Attempt to Resuscitate)     Medical Interventions (Patient has pulse or is breathing):    Full Support         No future appointments.    Additional Instructions for the Follow-ups that You Need to Schedule       Call MD With Problems / Concerns   As directed      Call the office,  [phone] with any questions or concerns following your surgery.    Order Comments: Call the office, [phone] with any questions or concerns following your surgery.         Discharge Follow-up with PCP   As directed       Currently Documented PCP:    Jackie Uriarte    PCP Phone Number:    [phone]     Follow Up Details: PCP next 1 to 2-week        Discharge Follow-up with Specified Provider: Dr. Wodoruff, General Surgery. Call [phone] to schedule; 2 Weeks   As directed      To: Dr. Woodruff, General Surgery. Call [phone] to schedule   Follow Up: 2 Weeks        Discharge Follow-up with Specified Provider: Surgery per the recommendation   As directed      To: Surgery per the recommendation                Time spent on Discharge including face to face service:  >30 minutes    Signature: Electronically signed by Ingrid Gay MD, 05/27/24, 13:00 EDT.  [facility] Hospitalist Team

## 2024-05-27 NOTE — PLAN OF CARE
Problem: Pain Acute  Goal: Acceptable Pain Control and Functional Ability  Outcome: Ongoing, Progressing  Intervention: Prevent or Manage Pain  Recent Flowsheet Documentation  Taken 5/26/2024 2146 by Julieta Melendrez RN  Medication Review/Management: medications reviewed  Intervention: Optimize Psychosocial Wellbeing  Recent Flowsheet Documentation  Taken 5/26/2024 2146 by Julieta Melendrez RN  Diversional Activities: television     Problem: Adult Inpatient Plan of Care  Goal: Plan of Care Review  Outcome: Ongoing, Progressing  Flowsheets (Taken 5/26/2024 2153)  Progress: no change  Plan of Care Reviewed With:   patient   significant other  Outcome Evaluation: new admit  Goal: Patient-Specific Goal (Individualized)  Outcome: Ongoing, Progressing  Goal: Absence of Hospital-Acquired Illness or Injury  Outcome: Ongoing, Progressing  Intervention: Identify and Manage Fall Risk  Recent Flowsheet Documentation  Taken 5/26/2024 2146 by Julieta Melendrez RN  Safety Promotion/Fall Prevention:   activity supervised   assistive device/personal items within reach   clutter free environment maintained   fall prevention program maintained   lighting adjusted   nonskid shoes/slippers when out of bed   room organization consistent   safety round/check completed  Intervention: Prevent Skin Injury  Recent Flowsheet Documentation  Taken 5/26/2024 2146 by Julieta Melendrez RN  Body Position: position changed independently  Intervention: Prevent and Manage VTE (Venous Thromboembolism) Risk  Recent Flowsheet Documentation  Taken 5/26/2024 2146 by Julieta Melendrez RN  Activity Management: ambulated to bathroom  Goal: Optimal Comfort and Wellbeing  Outcome: Ongoing, Progressing  Intervention: Provide Person-Centered Care  Recent Flowsheet Documentation  Taken 5/26/2024 2146 by Julieta Melendrez RN  Trust Relationship/Rapport:   care explained   choices provided   emotional support provided   empathic listening provided   questions  answered   questions encouraged   reassurance provided   thoughts/feelings acknowledged  Goal: Readiness for Transition of Care  Outcome: Ongoing, Progressing   Goal Outcome Evaluation:  Plan of Care Reviewed With: patient, significant other        Progress: no change  Outcome Evaluation: new admit

## 2024-05-27 NOTE — PROGRESS NOTES
[facility] MEDICINE SERVICE  DAILY PROGRESS NOTE    NAME: Damian Casillas  : 2000  MRN: [id number]      LOS: 1 day     PROVIDER OF SERVICE: Ingrid Gay MD    Chief Complaint: Abdominal pain    History of Present Illness: Damian Casillas is a 24 y.o. adult born female but identifies as male with a CMH of ADHD, asthma, autism, bipolar 1 disorder, depression, anxiety, dyslipidemia, obesity, overactive bladder, PTSD, gender dysphoria has been on testosterone for 2 years scheduled for breast removal surgery in August and hysterectomy for female to male transition who presented to [facility] upon transfer from [facility] emergency department on 2024 where he presented with complaints of right lower and mid abdominal pain.  He reports he has IBS and has chronic pain however this intensified about 8 AM this morning after a bowel movement.  He reports he took an Imodium and a  laxative suppository this morning.  He reports a bowel movement was loose .  He reports frequent bloody stool and has had a colonoscopy in the past was told he had IBS.  He also reports mucus in the stool.  He reports subjective fever but is afebrile here.  He reported nausea at home today and forced himself to vomit without hematemesis      .  He reports urinary frequency which is not uncommon for him but denies any dysuria.   [facility] positive for cannabinoids and tricyclic antidepressants.  Lipase was 111, lactic acid 2.3, urinalysis showed negative nitrites trace leukocytes 6-10 WBCs 11-20 epithelial cells moderate bacteria.  Urine pregnancy was negative.  Labs showed an ALT of 70 AST of 45 albumin 5.1, WBC 13.8 calcium 10.7.  [facility] was reported as inoperable ,  therefore no CT was done.  [facility].  He was given a one-time dose of IV Zosyn in the emergency  department.  Repeat CBC CMP, lactic and CT abdomen pelvis with contrast has been ordered and is pending.Patient will be admitted foir further evaluation and treatment.  Patient    Subjective:     Interval History:      Review of Systems:   Negative except what is listed above     Objective:     Vital Signs  Temp:  [98.1 °F (36.7 °C)-98.6 °F (37 °C)] 98.2 °F (36.8 °C)  Heart Rate:  [89-96] 95  Resp:  [13-20] 13  BP: (119-130)/(67-84) 123/67   Body mass index is 39.51 kg/m².    Physical Exam    General Appearance:    Alert, cooperative, in no acute distress   Head:    Normocephalic, without obvious abnormality, atraumatic   Eyes:            conjunctivae and sclerae normal, no   icterus, no pallor, corneas  clear, PERRLA   Neck:   No adenopathy, supple, trachea midline, no thyromegaly, no   carotid bruit, no JVD   Lungs:     Clear to auscultation,respirations regular, even and                  unlabored    Heart:    Regular rhythm and normal rate, normal S1 and S2, no            murmur, no gallop, no rub, no click   Abdomen:     Normal bowel sounds, no masses, no organomegaly, soft        jonna, non-distended, no guarding, no rebound                No tenderness   Extremities:   Moves all extremities well, no edema, no cyanosis, no             redness   Lymph nodes:   No palpable adenopathy   Neurologic:   Cranial nerves 2 - 12 grossly intact, sensation intact, DTR       present and equal bilaterally       Scheduled Meds   cholecalciferol, 2,000 Units, Oral, Daily  clonazePAM, 0.5 mg, Oral, BID  cyclobenzaprine, 10 mg, Oral, Nightly  dicyclomine, 10 mg, Oral, 4x Daily AC & at Bedtime  DULoxetine, 20 mg, Oral, Daily  DULoxetine, 30 mg, Oral, Daily  gabapentin, 300 mg, Oral, TID  pantoprazole, 40 mg, Intravenous, Q AM  piperacillin-tazobactam, 3.375 g, Intravenous, Q8H  propranolol, 10 mg, Oral, Daily  propranolol, 20 mg, Oral, Nightly       PRN Meds     albuterol    cetirizine    HYDROmorphone    hydrOXYzine     ondansetron    Pharmacy to Dose Zosyn   Infusions  Pharmacy to Dose Zosyn,   sodium chloride, 125 mL/hr, Last Rate: 125 mL/hr (05/26/24 3060)          Diagnostic Data    Results from last 7 days   Lab Units 05/27/24  0615 05/26/24  2303   WBC 10*3/mm3  --  9.49   HEMOGLOBIN g/dL  --  11.4*   HEMATOCRIT %  --  35.6*   PLATELETS 10*3/mm3  --  283   GLUCOSE mg/dL  --  121*   CREATININE mg/dL  --  0.97   BUN mg/dL  --  7   SODIUM mmol/L  --  142   POTASSIUM mmol/L 3.5 3.4*   ANION GAP mmol/L  --  10.4       CT Abdomen Pelvis With Contrast    Result Date: 5/27/2024  Impression: There are findings that would suggest early acute appendicitis. Electronically Signed: Thad Brennan MD  5/27/2024 12:20 AM EDT  Workstation ID: [id number]       I have personally reviewed the patient's new results.     Assessment/Plan:     Active and Resolved Problems    Acute appendicitis  Gender dysphoria  Anxiety depression/bipolar  Morbid obesity  History of irritable bowel syndrome  Hyperlipidemia  History of autism  Fibromyalgia  Acid reflux    Suggestion:    5/27-at this time I will continue antibiotic.  Surgeries been consulted.  Keep NPO.    DVT prophylaxis:  No DVT prophylaxis order currently exists.         Code status is   Code Status and Medical Interventions:   Ordered at: 05/26/24 8083     Code Status (Patient has no pulse and is not breathing):    CPR (Attempt to Resuscitate)     Medical Interventions (Patient has pulse or is breathing):    Full Support       Plan for disposition: 5/29    Time: 30 minutes    Signature: Electronically signed by Ingrid Gay MD, 05/27/24, 09:41 EDT.  [facility] Hospitalist Team

## 2024-05-27 NOTE — ANESTHESIA PROCEDURE NOTES
Airway  Urgency: elective    Date/Time: 5/27/2024 10:20 AM  Airway not difficult    General Information and Staff    Patient location during procedure: OR  Anesthesiologist: Kevin Conn MD    Indications and Patient Condition  Indications for airway management: airway protection    Preoxygenated: yes  MILS maintained throughout  Mask difficulty assessment: 0 - not attempted    Final Airway Details  Final airway type: endotracheal airway      Successful airway: ETT  Cuffed: yes   Successful intubation technique: direct laryngoscopy  Endotracheal tube insertion site: oral  Blade: Alexandru  Blade size: 3  ETT size (mm): 7.0  Cormack-Lehane Classification: grade IIa - partial view of glottis  Placement verified by: chest auscultation and capnometry   Measured from: lips  ETT/EBT  to lips (cm): 22  Number of attempts at approach: 1  Assessment: lips, teeth, and gum same as pre-op and atraumatic intubation    Additional Comments  Minimal occlusion pressure in cuff.

## 2024-05-27 NOTE — ANESTHESIA POSTPROCEDURE EVALUATION
Patient: Damian Casillas    Procedure Summary       Date: 05/27/24 Room / Location: [facility] OR 06 / [facility] MAIN OR    Anesthesia Start: 1012 Anesthesia Stop: 1112    Procedure: APPENDECTOMY LAPAROSCOPIC (Abdomen) Diagnosis:       Acute appendicitis      (Acute appendicitis)    Surgeons: Ming Woodruff MD Provider: Kevin Conn MD    Anesthesia Type: general ASA Status: 3            Anesthesia Type: general    Vitals  Vitals Value Taken Time   /67 05/27/24 1158   Temp 97.2 °F (36.2 °C) 05/27/24 1157   Pulse 99 05/27/24 1158   Resp 16 05/27/24 1157   SpO2 88 % 05/27/24 1158   Vitals shown include unfiled device data.        Post Anesthesia Care and Evaluation    Patient location during evaluation: PACU  Patient participation: complete - patient participated  Level of consciousness: awake  Pain scale: See nurse's notes for pain score.  Pain management: adequate    Airway patency: patent  Anesthetic complications: No anesthetic complications  PONV Status: none  Cardiovascular status: acceptable  Respiratory status: acceptable and spontaneous ventilation  Hydration status: acceptable    Comments: Patient seen and examined postoperatively; vital signs stable; SpO2 greater than or equal to 90%; cardiopulmonary status stable; nausea/vomiting adequately controlled; pain adequately controlled; no apparent anesthesia complications; patient discharged from anesthesia care when discharge criteria were met

## 2024-05-27 NOTE — CONSULTS
General Surgery Consult Note      Name: Damian Casillas ADMIT: 2024   : 2000  PCP: Jackie Uriarte    MRN: [id number] LOS: 1 days   AGE/SEX: 24 y.o. adult  ROOM: [facility] [place]/[address] [place] [address]      Patient Care Team:  Jackie Uriarte as PCP - General (Nurse Practitioner)  Zeb Lane MD as Surgeon (General Surgery)  No chief complaint on file.  Right lower quadrant abdominal pain    Subjective   24-year-old transgender female to male person who has had a long history of some lower abdominal symptoms who over the last several months has had some intermittent right lower quadrant abdominal pain that became much more severe over the last 24 to 48 hours.  This was associated with severe nausea and vomiting.  Some relative lack of bowel function over the last 24 hours.  He was seen at an outside hospital he did not have a CT scan and was transferred in for CT.  CT scan demonstrated 10 mm appendix demonstrating possible early appendicitis I was asked to see him for management.    Past Medical History:   Diagnosis Date    ADHD     Allergic rhinitis     Anemia     Arthritis     Asthma     Autism     level 2    Bipolar 1 disorder     Depression     Depressive disorder     Dyslipidemia     Dysmenorrhea     Endocrine disorder     Environmental allergies     Fatigue     Fibrocystic breast     Fibromyalgia     Gender dysphoria     GERD (gastroesophageal reflux disease)     History of MRSA infection     History of suicide attempt     Hyperchloremia     Hyperlordosis deformity of lumbar spine     Hypertriglyceridemia     Hypovitaminosis D     Overactive bladder     Panic     PTSD (post-traumatic stress disorder)     Reactive attachment disorder     TMJ (dislocation of temporomandibular joint)     Vertigo      Past Surgical History:   Procedure Laterality Date    COLONOSCOPY  2023    ENDOSCOPY  2023    MASS EXCISION Left     arm    NECK MASS EXCISION       Family History   Problem Relation Age of Onset     "No Known Problems Mother     Mental illness Father     Asthma Father     Heart disease Father        Social History     Tobacco Use    Smoking status: Never     Passive exposure: Past    Smokeless tobacco: Never    Tobacco comments:     Vapes every other day   Vaping Use    Vaping status: Some Days    Substances: THC, CBD    Devices: Disposable   Substance Use Topics    Alcohol use: No    Drug use: No     Medications Prior to Admission   Medication Sig Dispense Refill Last Dose    albuterol sulfate  (90 Base) MCG/ACT inhaler Inhale 1-2 puffs every 4-6 hours as needed for shortness of breath       Apple Michael Vn-Grn Tea-Bit Or-Cr (APPLE CIDER VINEGAR PLUS PO) Take  by mouth.       B-D 3CC LUER-KEYLA SYR 32OA4-6/2 18G X 1-1/2\" 3 ML misc USE TO DRAW UP MEDICATION FOR WEEKLY INJECTION       B-D 3CC LUER-KEYLA SYR 25GX1\" 25G X 1\" 3 ML misc USE FOR TESTOSTERONE INJECTION WEEKLY AS DIRECTED       BD Hypodermic Needle 18G X 1\" misc        BD Syringe Slip Tip 25G X 5/8\" 1 ML misc        Caplyta 42 MG capsule Take 1 capsule by mouth Daily.       cetirizine (zyrTEC) 10 MG tablet Take 1 tablet by mouth As Needed for Allergies.       Cholecalciferol 50 MCG (2000 UT) capsule Take 3 capsules by mouth Every Morning.       clonazePAM (KlonoPIN) 0.5 MG disintegrating tablet Take 1 tablet by mouth 2 (Two) Times a Day.       Cyanocobalamin (CVS B12 GUMMIES PO) Take  by mouth.       cyclobenzaprine (FLEXERIL) 10 MG tablet Take 1 tablet by mouth every night at bedtime.       dicyclomine (BENTYL) 10 MG capsule Take 1 capsule by mouth 4 (Four) Times a Day Before Meals & at Bedtime.       DULoxetine (CYMBALTA) 20 MG capsule Take 1 capsule by mouth Daily. Pt also takes 30 mg at hs       gabapentin (NEURONTIN) 300 MG capsule Take 1 capsule by mouth 3 (Three) Times a Day.       hydrOXYzine (ATARAX) 25 MG tablet        medroxyPROGESTERone (DEPO-PROVERA) 150 MG/ML injection Inject 1 mL into the appropriate muscle as directed by prescriber Every " 3 (Three) Months.       omega-3 acid ethyl esters (LOVAZA) 1 g capsule Take 1 capsule by mouth Every 12 (Twelve) Hours.       omeprazole (priLOSEC) 40 MG capsule Take 1 capsule by mouth 2 (Two) Times a Day.       Premarin 0.625 MG/GM vaginal cream INSERT 0.5 APPLICATORFUL BY VAGINAL ROUTE EVERY DAY. CYCLE 3 WEEKS ON 1 WEEK OFF       propranolol (INDERAL) 10 MG tablet Take 1 tablet by mouth 2 (Two) Times a Day.       Testosterone Enanthate 200 MG/ML solution INJECT 0.25ML IN THE MUSCLE EVERY WEEK        cholecalciferol, 2,000 Units, Oral, Daily  clonazePAM, 0.5 mg, Oral, BID  cyclobenzaprine, 10 mg, Oral, Nightly  dicyclomine, 10 mg, Oral, 4x Daily AC & at Bedtime  DULoxetine, 20 mg, Oral, Daily  DULoxetine, 30 mg, Oral, Daily  gabapentin, 300 mg, Oral, TID  pantoprazole, 40 mg, Intravenous, Q AM  piperacillin-tazobactam, 3.375 g, Intravenous, Q8H  propranolol, 10 mg, Oral, Daily  propranolol, 20 mg, Oral, Nightly      Pharmacy to Dose Zosyn,   sodium chloride, 125 mL/hr, Last Rate: 125 mL/hr (05/26/24 2227)        albuterol    cetirizine    HYDROmorphone    hydrOXYzine    ondansetron    Pharmacy to Dose Zosyn  Cariprazine, Fluoxetine hcl, Lamictal [lamotrigine], Latex, Fluoxetine, and Testosterone cypionate    Review of Systems   Constitutional:  Positive for chills and fever.   HENT:  Positive for sore throat. Negative for trouble swallowing.    Eyes:  Negative for blurred vision and double vision.   Respiratory:  Positive for shortness of breath. Negative for cough.    Cardiovascular:  Positive for chest pain. Negative for leg swelling.   Gastrointestinal:  Positive for abdominal pain, constipation, nausea and vomiting. Negative for abdominal distention, blood in stool and diarrhea.   Genitourinary:  Positive for dysuria. Negative for hematuria.        Objective     Vital Signs and Labs:  Vital Signs Patient Vitals for the past 24 hrs:   BP Temp Temp src Pulse Resp SpO2 Height Weight   05/27/24 0753 123/67 98.2 °F  "(36.8 °C) Oral 95 13 98 % -- --   05/27/24 0400 124/84 98.1 °F (36.7 °C) Oral 96 16 97 % -- --   05/27/24 0000 130/81 98.1 °F (36.7 °C) Oral 89 19 97 % -- --   05/26/24 2138 119/68 98.6 °F (37 °C) Oral 93 20 96 % 162.6 cm (64\") 104 kg (230 lb 2.6 oz)       Physical Exam:  Physical Exam  Constitutional:       Appearance: Normal appearance.   HENT:      Head: Normocephalic and atraumatic.   Cardiovascular:      Rate and Rhythm: Normal rate.   Pulmonary:      Effort: Pulmonary effort is normal. No respiratory distress.   Abdominal:      Palpations: Abdomen is soft.      Comments: Focally tender to palpation the right lower quadrant with voluntary involuntary guarding   Skin:     General: Skin is warm and dry.   Neurological:      General: No focal deficit present.      Mental Status: He is alert. Mental status is at baseline.   Psychiatric:         Mood and Affect: Mood normal.         Behavior: Behavior normal.         CBC    Results from last 7 days   Lab Units 05/26/24  2303   WBC 10*3/mm3 9.49   HEMOGLOBIN g/dL 11.4*   PLATELETS 10*3/mm3 283     BMP   Results from last 7 days   Lab Units 05/27/24  0615 05/26/24  2303   SODIUM mmol/L  --  142   POTASSIUM mmol/L 3.5 3.4*   CHLORIDE mmol/L  --  110*   CO2 mmol/L  --  21.6*   BUN mg/dL  --  7   CREATININE mg/dL  --  0.97   GLUCOSE mg/dL  --  121*     Radiology(recent) CT Abdomen Pelvis With Contrast    Result Date: 5/27/2024  Impression: There are findings that would suggest early acute appendicitis. Electronically Signed: Thad Brennan MD  5/27/2024 12:20 AM EDT  Workstation ID: [id number]     I reviewed the patient's new clinical results.  I reviewed the patient's new imaging results and agree with the interpretation.    Assessment & Plan       Abdominal pain    Leukocytosis    Gender dysphoria    Asthma    Bipolar 1 disorder    Obese    IBS (irritable bowel syndrome)    Hyperlipidemia      24 y.o. adult presenting with right lower quadrant Yash pain imaging " concerning for acute appendicitis    I counseled him about the risk and benefits of laparoscopy and appendectomy.  Talked about the steps of appendectomy recovery anticipated cover the possible complications we talked the possibility for open surgery.  Talked about this possibly not explaining all of his symptoms.  Alternatives including antibiotics alone were discussed but he was very interested in having the appendix removed.  Will go ahead and proceed with the appendectomy.      This note was created using Dragon Voice Recognition software.    Ming Woodruff MD  05/27/24  09:49 EDT

## 2024-05-27 NOTE — ANESTHESIA PREPROCEDURE EVALUATION
Anesthesia Evaluation     Patient summary reviewed and Nursing notes reviewed   no history of anesthetic complications:   NPO Solid Status: > 8 hours  NPO Liquid Status: > 8 hours           Airway   Mallampati: II  TM distance: >3 FB  Neck ROM: full  No difficulty expected  Dental - normal exam     Pulmonary - normal exam   (+) asthma,  Cardiovascular - normal exam    (+) hyperlipidemia      Neuro/Psych  (+) dizziness/light headedness, psychiatric history Anxiety, Depression and Bipolar  GI/Hepatic/Renal/Endo    (+) obesity, GERD    ROS Comment: IBS, acute appendicitis    Musculoskeletal     Abdominal  - normal exam   Substance History - negative use     OB/GYN negative ob/gyn ROS         Other   arthritis,                       Anesthesia Plan    ASA 3     general     intravenous induction     Anesthetic plan, risks, benefits, and alternatives have been provided, discussed and informed consent has been obtained with: patient.        CODE STATUS:    Code Status (Patient has no pulse and is not breathing): CPR (Attempt to Resuscitate)  Medical Interventions (Patient has pulse or is breathing): Full Support

## 2024-05-27 NOTE — OP NOTE
Operative Report:    Patient Name:  Damian Casillas  YOB: 2000    Date of Surgery:  5/27/2024     Indications: 24-year-old person who presented with a long history of right lower quad abdominal pain ultimately culminating a CT scan demonstrating a 1 cm appendix concerning for acute appendicitis I counseled him about the risk and benefits of surgery understood the risk of appendectomy and wished to proceed.    Pre-op Diagnosis:   Acute appendicitis       Post-Op Diagnosis Codes:  Acute appendicitis    Procedure/CPT® Codes:      Procedure(s):  APPENDECTOMY LAPAROSCOPIC    Staff:  Surgeon(s):  Ming Woodruff MD    Circulator: Mp Khan RN  Scrub Person: Mackenzie Calixto          Anesthesia: General    Estimated Blood Loss: minimal    Implants:    Implant Name Type Inv. Item Serial No.  Lot No. LRB No. Used Action   RELOAD STPLR ENDO ROBLES TRISTAPLE 45 ART MD GRIFFITHS  [id number] Implant RELOAD STPLR ENDO ROBLES TRISTAPLE 45 ART MD HALLIE YU [id number] N/A 1 Implanted   RELOAD STPLR ENDO ROBLES TRISTAPLE 2.0 45 ART MARCO TOPETE - [id number] Implant RELOAD STPLR ENDO ROBLES TRISTAPLE 2.0 45 ART MARCO CHAVIRA  COVANI [id number] N/A 1 Implanted       Specimen:          Specimens       ID Source Type Tests Collected By Collected At Frozen?    A Large Intestine, Appendix Tissue TISSUE PATHOLOGY EXAM   Ming Woodruff MD 5/27/24 1004               Findings: Dilated appendix with some hyperemia suggesting acute appendicitis    Complications: None    Description of Procedure: Patient was taken to the operating placed on the operating table in supine [place] on general anesthesia.  His abdomen was prepped and draped normal sterile fashion.  Timeout was performed identifying correct patient received and site of operation.  I began the operation by entering the abdomen through a left mid abdominal 5 mm optical trocar entry.  Upon entry the abdominal cavity abdomen was insufflated camera produced there is no evidence of  injury to underlying structures.  He was placed in [place] with right side up.  A periumbilical 12 and a lower abdominal 5 mm port were placed under laparoscopic guidance.  The abdomen was inspected the gallbladder was normal the liver appeared normal the small bowel had a normal appearance there was no significant dilation or hyperemia of the bowel the colon was normal in the pelvis there were 2 ovaries and uterus that appeared normal.  The appendix itself was in a location behind the small bowel and had to be swept out and in doing so revealed an appendix that was dilated hyperemic and had some minimal fibrinous inflammatory changes.  The window was created at the base the appendix using [place] dissector and an Endo ROBLES  purple load stapler was used to divide at its base.  I then used a gold load to divide the mesoappendix at any significant bleeding.  The appendix was placed Endo Catch bag and removed.  The port then was closed with suture passer.  The remainder of the abdomen is inspected there is no evidence of incidental injury.  The remaining ports were removed without abdominal hemorrhage abdomen is desufflated skin was closed with 4-0 Vicryl suture and skin affix.  He tolerated the procedure well's been transferred to recovery in satisfactory condition.    Ming Woodruff MD     Date: 5/27/2024  Time: 11:11 EDT    This note was created using Dragon Voice Recognition software.

## 2024-05-27 NOTE — SIGNIFICANT NOTE
CT abdomen with pelvis resulted, per radiology shows early appendicitis .General surgery consulted on antibiotics

## 2024-05-27 NOTE — H&P
[facility] Medicine Services  History & Physical    Patient Name: Damian Casillas  : 2000  MRN: [id number]  Primary Care Physician:  Jackie Uriarte  Date of admission: 2024  Date and Time of Service: 2024 at 2230    Subjective      Chief Complaint: abdominal pain     History of Present Illness: Damian Casillas is a 24 y.o. adult born female but identifies as male with a CMH of ADHD, asthma, autism, bipolar 1 disorder, depression, anxiety, dyslipidemia, obesity, overactive bladder, PTSD, gender dysphoria has been on testosterone for 2 years scheduled for breast removal surgery in August and hysterectomy for female to male transition who presented to [facility] upon transfer from [facility] emergency department on 2024 where he presented with complaints of right lower and mid abdominal pain.  He reports he has IBS and has chronic pain however this intensified about 8 AM this morning after a bowel movement.  He reports he took an Imodium and a  laxative suppository this morning.  He reports a bowel movement was loose .  He reports frequent bloody stool and has had a colonoscopy in the past was told he had IBS.  He also reports mucus in the stool.  He reports subjective fever but is afebrile here.  He reported nausea at home today and forced himself to vomit without hematemesis      .  He reports urinary frequency which is not uncommon for him but denies any dysuria.   [facility] positive for cannabinoids and tricyclic antidepressants.  Lipase was 111, lactic acid 2.3, urinalysis showed negative nitrites trace leukocytes 6-10 WBCs 11-20 epithelial cells moderate bacteria.  Urine pregnancy was negative.  Labs showed an ALT of 70 AST of 45 albumin 5.1, WBC 13.8 calcium 10.7.  CT scanner at [facility] ED was reported as inoperable ,  therefore no CT was done.  Appendicitis was in the differential at [facility] and general surgery  reportedly consulted.  He was given a one-time dose of IV Zosyn in the emergency department.  Repeat CBC CMP, lactic and CT abdomen pelvis with contrast has been ordered and is pending.Patient will be admitted foir further evaluation and treatment.    Review of Systems   Constitutional:  Positive for fever.   HENT: Negative.     Eyes: Negative.    Respiratory: Negative.     Cardiovascular: Negative.    Gastrointestinal:  Positive for abdominal pain, blood in stool, diarrhea, nausea and vomiting.   Endocrine: Negative.    Genitourinary:  Positive for frequency.   Musculoskeletal: Negative.    Allergic/Immunologic: Negative.    Neurological: Negative.    Hematological: Negative.    Psychiatric/Behavioral: Negative.     All other systems reviewed and are negative.      Personal History     Past Medical History:   Diagnosis Date    ADHD     Allergic rhinitis     Anemia     Arthritis     Asthma     Autism     level 2    Bipolar 1 disorder     Depression     Depressive disorder     Dyslipidemia     Dysmenorrhea     Endocrine disorder     Environmental allergies     Fatigue     Fibrocystic breast     Fibromyalgia     Gender dysphoria     GERD (gastroesophageal reflux disease)     History of MRSA infection     History of suicide attempt     Hyperchloremia     Hyperlordosis deformity of lumbar spine     Hypertriglyceridemia     Hypovitaminosis D     Overactive bladder     Panic     PTSD (post-traumatic stress disorder)     Reactive attachment disorder     TMJ (dislocation of temporomandibular joint)     Vertigo        Past Surgical History:   Procedure Laterality Date    COLONOSCOPY  09/01/2023    ENDOSCOPY  09/01/2023    MASS EXCISION Left     arm    NECK MASS EXCISION         Family History: family history includes Asthma in his father; Heart disease in his father; Mental illness in his father; No Known Problems in his mother. Otherwise pertinent FHx was reviewed and not pertinent to current issue.    Social History:   "reports that he has never smoked. He has been exposed to tobacco smoke. He has never used smokeless tobacco. He reports that he does not drink alcohol and does not use drugs.    Home Medications:  Prior to Admission Medications       Prescriptions Last Dose Informant Patient Reported? Taking?    albuterol sulfate  (90 Base) MCG/ACT inhaler   Yes Yes    Inhale 1-2 puffs every 4-6 hours as needed for shortness of breath    Apple Michael Vn-Grn Tea-Bit Or-Cr (APPLE CIDER VINEGAR PLUS PO)   Yes Yes    Take  by mouth.    B-D 3CC LUER-KEYLA SYR 17UN4-8/2 18G X 1-1/2\" 3 ML misc   Yes Yes    USE TO DRAW UP MEDICATION FOR WEEKLY INJECTION    B-D 3CC LUER-KEYLA SYR 25GX1\" 25G X 1\" 3 ML misc   Yes Yes    USE FOR TESTOSTERONE INJECTION WEEKLY AS DIRECTED    BD Hypodermic Needle 18G X 1\" misc   Yes Yes    BD Syringe Slip Tip 25G X 5/8\" 1 ML misc   Yes Yes    Caplyta 42 MG capsule   Yes Yes    Take 1 capsule by mouth Daily.    cetirizine (zyrTEC) 10 MG tablet   Yes Yes    Take 1 tablet by mouth As Needed for Allergies.    Cholecalciferol 50 MCG (2000 UT) capsule   Yes Yes    Take 3 capsules by mouth Every Morning.    clonazePAM (KlonoPIN) 0.5 MG disintegrating tablet   Yes Yes    Take 1 tablet by mouth 2 (Two) Times a Day.    Cyanocobalamin (CVS B12 GUMMIES PO)   Yes Yes    Take  by mouth.    cyclobenzaprine (FLEXERIL) 10 MG tablet   Yes Yes    Take 1 tablet by mouth every night at bedtime.    dicyclomine (BENTYL) 10 MG capsule   Yes Yes    Take 1 capsule by mouth 4 (Four) Times a Day Before Meals & at Bedtime.    DULoxetine (CYMBALTA) 20 MG capsule   Yes Yes    Take 1 capsule by mouth Daily. Pt also takes 30 mg at hs    gabapentin (NEURONTIN) 300 MG capsule   Yes Yes    Take 1 capsule by mouth 3 (Three) Times a Day.    hydrOXYzine (ATARAX) 25 MG tablet   Yes Yes    medroxyPROGESTERone (DEPO-PROVERA) 150 MG/ML injection   Yes Yes    Inject 1 mL into the appropriate muscle as directed by prescriber Every 3 (Three) Months.    " omega-3 acid ethyl esters (LOVAZA) 1 g capsule   Yes Yes    Take 1 capsule by mouth Every 12 (Twelve) Hours.    omeprazole (priLOSEC) 40 MG capsule   Yes Yes    Take 1 capsule by mouth 2 (Two) Times a Day.    Premarin 0.625 MG/GM vaginal cream   Yes Yes    INSERT 0.5 APPLICATORFUL BY VAGINAL ROUTE EVERY DAY. CYCLE 3 WEEKS ON 1 WEEK OFF    propranolol (INDERAL) 10 MG tablet   Yes Yes    Take 1 tablet by mouth 2 (Two) Times a Day.    Testosterone Enanthate 200 MG/ML solution   Yes Yes    INJECT 0.25ML IN THE MUSCLE EVERY WEEK              Allergies:  Allergies   Allergen Reactions    Cariprazine Nausea And Vomiting, Palpitations and Shortness Of Breath    Fluoxetine Hcl Other (See Comments)    Lamictal [Lamotrigine] Nausea Only    Latex Unknown - Low Severity    Fluoxetine Rash    Testosterone Cypionate Itching, Rash and Swelling       Objective      Vitals:   Temp:  [98.6 °F (37 °C)] 98.6 °F (37 °C)  Heart Rate:  [93] 93  Resp:  [20] 20  BP: (119)/(68) 119/68  Body mass index is 39.51 kg/m².  Physical Exam  Vitals reviewed.   Constitutional:       Appearance: Normal appearance. He is obese.   HENT:      Head: Normocephalic and atraumatic.      Right Ear: External ear normal.      Left Ear: External ear normal.      Nose: Nose normal.      Mouth/Throat:      Mouth: Mucous membranes are moist.   Eyes:      Extraocular Movements: Extraocular movements intact.   Cardiovascular:      Rate and Rhythm: Normal rate and regular rhythm.      Pulses: Normal pulses.      Heart sounds: Normal heart sounds.   Pulmonary:      Effort: Pulmonary effort is normal.      Breath sounds: Normal breath sounds.   Abdominal:      General: Bowel sounds are normal.      Palpations: Abdomen is soft.   Genitourinary:     Comments: deferred  Musculoskeletal:         General: Normal range of motion.      Cervical back: Normal range of motion and neck supple.   Skin:     General: Skin is warm and dry.   Neurological:      General: No focal deficit  present.      Mental Status: He is alert and oriented to person, place, and time.   Psychiatric:         Mood and Affect: Mood normal.         Behavior: Behavior normal.         Thought Content: Thought content normal.         Judgment: Judgment normal.         Diagnostic Data:  Lab Results (last 24 hours)       ** No results found for the last 24 hours. **             Imaging Results (Last 24 Hours)       ** No results found for the last 24 hours. **              Assessment & Plan        This is a 24 y.o. adult with:    Active and Resolved Problems  Active Hospital Problems    Diagnosis  POA    **Abdominal pain [R10.9]  Yes     Priority: High    Leukocytosis [D72.829]  Yes     Priority: Medium    Gender dysphoria [F64.9]  Yes    Asthma [J45.909]  Unknown    Bipolar 1 disorder [F31.9]  Yes    Obese [E66.9]  Yes    IBS (irritable bowel syndrome) [K58.9]  Yes    Hyperlipidemia [E78.5]  Yes      Resolved Hospital Problems   No resolved problems to display.     Abdominal pain right lower quadrant, etiology undetermined pending CT abdomen pelvis with contrast ordered stat, appendicitis in the differential, surgery consulted from outside facility will follow 0.5 mg Dilaudid every 4 hours as needed, 4 mg IV Zofran every 6 hours as needed, n.p.o. at midnight, normal saline 125 cc/h    Leukocytosis, afebrile, etiology undetermined see plan above CT ordered and pending may be appendicitis, colitis, also consider possible UTI as both bacteria and squamous epithelial cells, continue IV Zosyn for now blood cultures pending    Gender dysphoria, on testosterone 2 years not reordered,, scheduled for breast removal surgery in August and hysterectomy for female to male transition identifies as male    Asthma stable, reordered home albuterol as needed    Bipolar 1 disorder/anxiety/PTSD, reordered home clonazepam, hydroxyzine, Cymbalta, propranolol, no Captyla in formulary, n.p.o. at midnight    Obesity BMI 39.51 lifestyle  medication    IBS, reordered home Bentyl and PPI    Hyperlipidemia no statin on current med list?      DVT prophylaxis:  No DVT prophylaxis order currently exists.        The patient desires to be as follows:    CODE STATUS:    Code Status (Patient has no pulse and is not breathing): CPR (Attempt to Resuscitate)  Medical Interventions (Patient has pulse or is breathing): Full Support          Admission Status:  I believe this patient meets inpatient  status.    Expected Length of Stay: pending further evaluation     PDMP and Medication Dispenses via Sidebar reviewed and consistent with patient reported medications.    I discussed the patient's findings and my recommendations with patient and significant other .      Signature:     This document has been electronically signed by DAYLIN Morris on May 26, 2024 23:26 EDT   [facility] Hospitalist Team

## 2024-05-28 LAB
QT INTERVAL: 353 MS
QTC INTERVAL: 405 MS

## 2024-05-28 NOTE — CASE MANAGEMENT/SOCIAL WORK
Case Management Discharge Note      Final Note: Routine  home         Selected Continued Care - Discharged on 5/27/2024 Admission date: 5/26/2024 - Discharge disposition: Home or Self Care             Transportation Services  Private: Car    Final Discharge Disposition Code: 01 - home or self-care

## 2024-05-30 ENCOUNTER — TELEPHONE (OUTPATIENT)
Dept: SURGERY | Facility: CLINIC | Age: 24
End: 2024-05-30
Payer: MEDICAID

## 2024-05-30 NOTE — TELEPHONE ENCOUNTER
PO FU Call- spoke with pt. hi 2 wk po appt. Will fu then. Knows to call with any questions or concerns.      States feeling some better. Having some restroom issues. Informed can take miralax/docusate sodium and gas-x. Stated understood.

## 2024-05-31 LAB
LAB AP CASE REPORT: NORMAL
LAB AP DIAGNOSIS COMMENT: NORMAL
LAB AP SYNOPTIC CHECKLIST: NORMAL
PATH REPORT.FINAL DX SPEC: NORMAL
PATH REPORT.GROSS SPEC: NORMAL

## 2024-06-11 ENCOUNTER — OFFICE VISIT (OUTPATIENT)
Dept: SURGERY | Facility: CLINIC | Age: 24
End: 2024-06-11
Payer: MEDICAID

## 2024-06-11 VITALS
OXYGEN SATURATION: 98 % | WEIGHT: 223 LBS | HEART RATE: 87 BPM | BODY MASS INDEX: 38.07 KG/M2 | DIASTOLIC BLOOD PRESSURE: 68 MMHG | SYSTOLIC BLOOD PRESSURE: 108 MMHG | TEMPERATURE: 97.1 F | HEIGHT: 64 IN

## 2024-06-11 DIAGNOSIS — D3A.8 NEUROENDOCRINE NEOPLASM OF APPENDIX: Primary | ICD-10-CM

## 2024-06-11 PROCEDURE — 1160F RVW MEDS BY RX/DR IN RCRD: CPT | Performed by: SURGERY

## 2024-06-11 PROCEDURE — 1159F MED LIST DOCD IN RCRD: CPT | Performed by: SURGERY

## 2024-06-11 PROCEDURE — 99024 POSTOP FOLLOW-UP VISIT: CPT | Performed by: SURGERY

## 2024-06-11 RX ORDER — VIBEGRON 75 MG/1
1 TABLET, FILM COATED ORAL DAILY
COMMUNITY
Start: 2024-06-06

## 2024-06-11 RX ORDER — CIPROFLOXACIN 500 MG/1
1 TABLET, FILM COATED ORAL EVERY 12 HOURS SCHEDULED
COMMUNITY
Start: 2024-06-05

## 2024-06-11 NOTE — PROGRESS NOTES
"Olvin Casillas is a 24 y.o. adult.   Couple weeks out from diagnostic laparoscopy and appendectomy.  There was some inflammatory changes at the tip of the appendix.  In general he says his recovery has been okay.  Seems to be tolerating a diet reasonably well.  Having some more regular bowel function.  He says that he can evacuate better because he can strain a bit more without the discomfort.  There is no incisional concerns along the healing process but things have looked great from the pictures I have seen and look good today.    Objective   /68 (BP Location: Right arm, Patient Position: Sitting, Cuff Size: Large Adult)   Pulse 87   Temp 97.1 °F (36.2 °C) (Infrared)   Ht 162.6 cm (64\")   Wt 101 kg (223 lb)   LMP 04/20/2024 (Approximate)   SpO2 98%   BMI 38.28 kg/m²   Physical Exam  On exam the abdomen is soft nondistended nontender to palpation incisions are all healing well without erythema drainage or evidence of hernia.  Assessment & Plan   Diagnoses and all orders for this visit:    1. Neuroendocrine neoplasm of appendix (Primary)  -     Ambulatory Referral to Oncology    Pathology reviewed consistent with neuroendocrine tumor.    This lesion was less than 1 cm confined to the appendix without evidence of protruding through the serosa of the appendix.  It was at the tip so there was a wide margin at the base.  I believe that that is all the treatment that he will need for this appendiceal carcinoid.  However I talked to him about some of the long-term things to watch out for.  Does have a lot of medical issues and concern.  I talked about some type of surveillance that could be offered for both staging purposes now and surveillance in the future.  He is interested in having some his imaging done.  As this is the case I think is probably best to introduce him to the oncologist to have a more thorough discussion about neuroendocrine tumors and the possible staging and surveillance " imaging that can happen in the future.  He seemed agreeable to meeting with the oncologist.      Will be available as needed for any recurrent issues.  From my standpoint okay to increase activity as tolerated and diet as tolerated.    Ming Woodruff MD  6/11/2024  1:22 PM EDT    This note was created using Dragon Voice Recognition software.

## 2024-06-14 NOTE — PROGRESS NOTES
HEMATOLOGY ONCOLOGY OUTPATIENT CONSULTATION       Patient name: Damian Casillas  : 2000  MRN: [id number]  Primary Care Physician: Jackie Uriarte  Referring Physician: Ming Woodruff MD  Reason For Consult:       History of Present Illness:  Patient is a 24 y.o. adult born female but identifies as male with a CMH of ADHD, asthma, autism, bipolar 1 disorder, depression, anxiety, dyslipidemia, obesity, overactive bladder, PTSD, gender dysphoria has been on testosterone for 2 years scheduled for breast removal surgery in August and hysterectomy for female to male transition who presented to [facility] upon transfer from [facility] emergency department on 2024 where he presented with complaints of right lower and mid abdominal pain.    He reported he has IBS and has chronic pain however this intensified about 8 AM this morning after a bowel movement.  He reports he took an Imodium and a  laxative suppository this morning.  He reports a bowel movement was loose .  He reports frequent bloody stool and has had a colonoscopy in the past was told he had IBS.  He also reports mucus in the stool.  He reports subjective fever but is afebrile here.  He reported nausea at home and forced himself to vomit without hematemesis.  He reports urinary frequency which is not uncommon for him but denies any dysuria.   Labs from outlying facility show urine drug screen positive for cannabinoids and tricyclic antidepressants.  Lipase was 111, lactic acid 2.3,Labs showed an ALT of 70 AST of 45 albumin 5.1, WBC 13.8 calcium 10.7. [facility].  He was given a one-time dose of IV Zosyn in the emergency department.     24: CT ABDOMEN/PELVIS W CONTRAST:  Impression:  There are findings that would suggest early acute appendicitis.      24: Appendix, appendectomy:  Final Diagnosis    Well-differentiated neuroendocrine tumor, G1 (pT3, pN, see synoptic report and comment)    Tumor extends to subserosa    All surgical margins negative for tumor    Acute appendicitis  Comment    A low-grade tumor showing a nested pattern is present in the distal aspect of the appendix.  Immunohistochemistry was performed using appropriate controls and the tumor is positive for CD56, chromogranin and synaptophysin.  The staining pattern confirms the tumor as a well-differentiated neuroendocrine tumor.  A Ki-67 immunostain displays a proliferation index of less than 3% which is consistent with a grade 1 well-differentiated neuroendocrine tumor.  All surgical margins are negative.        Has been referred to us for further evaluation and management for incidental diagnosis of neuroendocrine tumor of the appendix.  Subjective:  Patient presents for initial consultation today.  The patient appears to have an anxious outlook and appears to be very worried/stressed out due to recent surgical findings.   The patient reported having underlying diagnosis of IBS with predominant constipation component, however he experiences intermittent fecal urgency.  As per medical history of rectal hemorrhoids and intermittent anal bleeding since 2022.  He reported that he experiences significant symptoms of heartburn for which he is taking oral PPIs.    In terms of current symptomatology, he reported having Intense abd pain off and on.  This pain appears to be chronic in nature and per patient he had undergone a colonoscopy for the symptoms in September 2023 at [facility] and he was told that he has inflammatory changes in the bowel [per patient].    Has ongoing Fatigue, patient is concerned about anemia. No iron infusions or RBC transfusions before.      Past Medical History:   Diagnosis Date    ADHD     Allergic rhinitis     Anemia     Arthritis     Asthma     Autism     level 2    Bipolar 1 disorder     Depression     Depressive  "disorder     Dyslipidemia     Dysmenorrhea     Endocrine disorder     Environmental allergies     Fatigue     Fibrocystic breast     Fibromyalgia     Gender dysphoria     GERD (gastroesophageal reflux disease)     History of MRSA infection     History of suicide attempt     Hyperchloremia     Hyperlordosis deformity of lumbar spine     Hypertriglyceridemia     Hypovitaminosis D     Overactive bladder     Panic     PTSD (post-traumatic stress disorder)     Reactive attachment disorder     TMJ (dislocation of temporomandibular joint)     Vertigo        Past Surgical History:   Procedure Laterality Date    APPENDECTOMY N/A 5/27/2024    Procedure: APPENDECTOMY LAPAROSCOPIC;  Surgeon: Ming Woodruff MD;  Location: [facility] MAIN OR;  Service: General;  Laterality: N/A;    COLONOSCOPY  09/01/2023    ENDOSCOPY  09/01/2023    MASS EXCISION Left     arm    NECK MASS EXCISION           Current Outpatient Medications:     albuterol sulfate  (90 Base) MCG/ACT inhaler, Inhale 1-2 puffs every 4-6 hours as needed for shortness of breath, Disp: , Rfl:     B-D 3CC LUER-KEYLA SYR 80TX9-3/2 18G X 1-1/2\" 3 ML misc, USE TO DRAW UP MEDICATION FOR WEEKLY INJECTION, Disp: , Rfl:     B-D 3CC LUER-KEYLA SYR 25GX1\" 25G X 1\" 3 ML misc, USE FOR TESTOSTERONE INJECTION WEEKLY AS DIRECTED, Disp: , Rfl:     BD Hypodermic Needle 18G X 1\" misc, , Disp: , Rfl:     BD Syringe Slip Tip 25G X 5/8\" 1 ML misc, , Disp: , Rfl:     Caplyta 42 MG capsule, Take 1 capsule by mouth Daily., Disp: , Rfl:     cetirizine (zyrTEC) 10 MG tablet, Take 1 tablet by mouth As Needed for Allergies., Disp: , Rfl:     ciprofloxacin (CIPRO) 500 MG tablet, Take 1 tablet by mouth Every 12 (Twelve) Hours., Disp: , Rfl:     clonazePAM (KlonoPIN) 0.5 MG disintegrating tablet, Take 1 tablet by mouth 2 (Two) Times a Day., Disp: , Rfl:     Cyanocobalamin ([employer] B12 GUMMIES PO), Take  by mouth., Disp: , Rfl:     cyclobenzaprine (FLEXERIL) 10 MG tablet, Take 1 tablet by mouth every night " at bedtime., Disp: , Rfl:     dicyclomine (BENTYL) 10 MG capsule, Take 1 capsule by mouth 4 (Four) Times a Day Before Meals & at Bedtime., Disp: , Rfl:     DULoxetine (CYMBALTA) 20 MG capsule, Take 1 capsule by mouth Daily. Pt also takes 30 mg at hs, Disp: , Rfl:     gabapentin (NEURONTIN) 300 MG capsule, Take 1 capsule by mouth 3 (Three) Times a Day., Disp: , Rfl:     Gemtesa 75 MG tablet, Take 1 tablet by mouth Daily., Disp: , Rfl:     hydrOXYzine (ATARAX) 25 MG tablet, , Disp: , Rfl:     omeprazole (priLOSEC) 40 MG capsule, Take 1 capsule by mouth 2 (Two) Times a Day., Disp: , Rfl:     propranolol (INDERAL) 10 MG tablet, Take 1 tablet by mouth 2 (Two) Times a Day., Disp: , Rfl:     Testosterone Enanthate 200 MG/ML solution, INJECT 0.25ML IN THE MUSCLE EVERY WEEK, Disp: , Rfl:     Allergies   Allergen Reactions    Cariprazine Nausea And Vomiting, Palpitations and Shortness Of Breath    Fluoxetine Hcl Other (See Comments)    Lamictal [Lamotrigine] Nausea Only    Latex Unknown - Low Severity    Fluoxetine Rash    Testosterone Cypionate Itching, Rash and Swelling       Family History   Problem Relation Age of Onset    No Known Problems Mother     Mental illness Father     Asthma Father     Heart disease Father        Cancer-related family history is not on file.      Social History     Tobacco Use    Smoking status: Never     Passive exposure: Past    Smokeless tobacco: Never    Tobacco comments:     Vapes every other day   Vaping Use    Vaping status: Some Days    Substances: THC, CBD    Devices: Disposable   Substance Use Topics    Alcohol use: No    Drug use: No     Social History     Social History Narrative    Not on file       ROS:   Review of Systems   Constitutional:  Positive for fatigue.   Gastrointestinal:  Positive for abdominal pain, blood in stool and constipation.   Genitourinary:  Positive for dysuria.   Musculoskeletal:  Positive for arthralgias.         Objective:    Vital Signs:  Vitals:     "06/18/24 1436   BP: 109/73   Pulse: 92   Temp: 98.1 °F (36.7 °C)   TempSrc: Oral   SpO2: 96%   Weight: 101 kg (222 lb 3.2 oz)   Height: 162.6 cm (64\")   PainSc:   6   PainLoc: Abdomen     Body mass index is 38.14 kg/m².    ECOG  (1) Restricted in physically strenuous activity, ambulatory and able to do work of light nature    Physical Exam:   Physical Exam  Constitutional:       Appearance: Normal appearance. He is normal weight.   HENT:      Head: Normocephalic and atraumatic.      Right Ear: External ear normal.      Left Ear: External ear normal.      Nose: Nose normal.      Mouth/Throat:      Mouth: Mucous membranes are moist.      Pharynx: Oropharynx is clear.   Eyes:      Extraocular Movements: Extraocular movements intact.      Conjunctiva/sclera: Conjunctivae normal.      Pupils: Pupils are equal, round, and reactive to light.   Cardiovascular:      Rate and Rhythm: Normal rate.      Pulses: Normal pulses.   Pulmonary:      Effort: Pulmonary effort is normal.   Abdominal:      General: Abdomen is flat.      Palpations: Abdomen is soft.   Musculoskeletal:         General: Normal range of motion.      Cervical back: Normal range of motion and neck supple.   Skin:     General: Skin is warm.   Neurological:      General: No focal deficit present.      Mental Status: He is alert and oriented to person, place, and time.   Psychiatric:         Mood and Affect: Mood is anxious.         Speech: Speech is rapid and pressured and tangential.         Judgment: Judgment normal.         Lab Results - Last 18 Months   Lab Units 06/18/24  1546 05/26/24 2303 01/27/24  1152   WBC 10*3/mm3 5.26 9.49 5.80   HEMOGLOBIN g/dL 12.3* 11.4* 12.1*   HEMATOCRIT % 38.2 35.6* 37.1*   PLATELETS 10*3/mm3 322 283 277   MCV fL 88.2 86.0 81.4     Lab Results - Last 18 Months   Lab Units 06/18/24  1546 05/27/24  0615 05/26/24  2303 01/27/24  1152 08/04/23  1429   SODIUM mmol/L 138  --  142 141 132*   POTASSIUM mmol/L 4.0 3.5 3.4* 3.7 4.1 " "  CHLORIDE mmol/L 107  --  110* 105 104   CO2 mmol/L 19.7*  --  21.6* 27.0 15.0*   BUN mg/dL 7  --  7 11 10   CREATININE mg/dL 0.96  --  0.97 0.93 1.01*   CALCIUM mg/dL 9.6  --  9.0 9.5 9.1   BILIRUBIN mg/dL 0.5  --   --  0.4 0.3   ALK PHOS U/L 135*  --   --  118* 99   ALT (SGPT) U/L 585*  --   --  40 21   AST (SGOT) U/L 188*  --   --  22 25   GLUCOSE mg/dL 96  --  121* 87 95       Lab Results   Component Value Date    GLUCOSE 121 (H) 05/26/2024    BUN 7 05/26/2024    CREATININE 0.97 05/26/2024    EGFRIFAFRI >60 02/02/2023    BCR 7.2 05/26/2024    K 3.5 05/27/2024    CO2 21.6 (L) 05/26/2024    CALCIUM 9.0 05/26/2024    ALBUMIN 4.4 01/27/2024    LABIL2 1.5 02/02/2023    AST 22 01/27/2024    ALT 40 01/27/2024       No results for input(s): \"APTT\", \"INR\", \"PTT\" in the last 87736 hours.    Lab Results   Component Value Date    IRON 96 06/18/2024    TIBC 483 06/18/2024    FERRITIN 59.00 06/18/2024       Lab Results   Component Value Date    FOLATE 3.53 (L) 06/18/2024       No results found for: \"OCCULTBLD\"    Lab Results   Component Value Date    RETICCTPCT 1.75 06/18/2024     Lab Results   Component Value Date    TPXRSQAQ53 868 06/18/2024     No results found for: \"SPEP\", \"UPEP\"  No results found for: \"LDH\", \"URICACID\"  Lab Results   Component Value Date    SEDRATE 25 (H) 01/27/2024     No results found for: \"FIBRINOGEN\", \"HAPTOGLOBIN\"  No results found for: \"PTT\", \"INR\"  No results found for: \"\"  No results found for: \"CEA\"  No components found for: \"CA-19-9\"  No results found for: \"PSA\"  Lab Results   Component Value Date    SEDRATE 25 (H) 01/27/2024          Synoptic Checklist  APPENDIX NEUROENDOCRINE TUMOR (APPENDIX NEUROENDOCRINE TUMOR - All Specimens) 9th Edition - Protocol posted: 12/13/2023  SPECIMEN  Procedure Appendectomy  .  TUMOR  Tumor Site Distal half of appendix  Histologic Type and Grade G1, well-differentiated neuroendocrine tumor  Histologic Grade Determination  Ki-67 Labeling Index Less than " 3%  Tumor Size Greatest Dimension (Centimeters): 0.9 cm  Tumor Extent Invades subserosa or mesoappendix without involvement of visceral  peritoneum  Lymphatic and / or Vascular Invasion Not identified  Perineural Invasion Not identified  .  MARGINS  Margin Status All margins negative for tumor  Closest Margin(s) to Tumor Proximal  Distance from Tumor to Closest Margin Greater than 1 cm  .  REGIONAL LYMPH NODES  Regional Lymph Node Status Not applicable (no regional lymph nodes submitted or found)  .  pTNM CLASSIFICATION (AJCC Version 9)  Reporting of pT, pN, and (when applicable) pM categories is based on information available to the pathologist at the time the report is issued. As  per the AJCC (Chapter 1, 8th Ed.) it is the managing physician’s responsibility to establish the final pathologic stage based upon all pertinent  information, including but potentially not limited to this pathology report.  pT Category pT3  pN Category pN not assigned (no nodes submitted or found)  Page: 1      Assessment & Plan     Appendiceal neuroendocrine tumor: pT3Nx  -This is an incidental finding on appendectomy for suspected appendicitis.  -Discussed the diagnosis, pathophysiology of neuroendocrine tumors and recommended treatment approach for appendiceal neuroendocrine tumors.  The patient was explained that these tumors are quite uncommon and treatment is usually on the lines of colorectal adenocarcinomas.  -Surgical pathology is consistent with low-grade well-differentiated neuroendocrine tumor with Ki-67 3%.  -The size of appendiceal neoplasm is small [<2cm), and literature supports appendectomy alone for small tumors, however given deep invasion (T3 disease), and no lymph node sampling available patient may require right hemicolectomy with regional lymph node dissection.  -Staging scans as above reported negative for metastatic disease.  -Will refer the patient to colorectal surgery to discuss further about appropriate  surgical management.  This was explained in detail to the patient  -Will obtain CT chest to complete staging workup.  Chromogranin A levels were reported negative.      Hemorrhoidal bleeding:  -This is a chronic problem for the patient, referral to colorectal surgery as above.    Fatigue:  -Likely multifactorial, secondary to multiple medical and psychiatric problems.  -CBC today reported unremarkable for anemia.  Iron profile not consistent with iron deficiency    Folate deficiency:  -Patient noted to have low folic acid levels 3.5  -Will prescribe oral folate supplementation.      Psychosocial issues:  -Patient is noted to have gender dysphoria and is undergoing workup for female to male transition surgeries including bilateral mastectomies and hysterectomy.  Currently on testosterone supplementation therapy.  -He appears to have some component of depression/anxiety, noted to have pressured speech and generalized worrying about most things.  -The extent of social support for the patient available is unclear at this moment.  -Will request for  evaluation to assess any social challenges and or needs.    Follow-up in 2 weeks to discuss further management.  Sooner as needed.          Thank you very much for providing the opportunity to participate in this patient’s care. Please do not hesitate to call if there are any other questions.    *ADDENDUM 6/21/2024:  Patient was noted to have significant transaminitis more than 10 fold increase in LFTs.  This was not reported to us as a critical result; upon noticing the LFT elevation the patient was advised to immediately come to emergency room for further evaluation.      Transaminitis:  -noted to have significant increase in LFTs following hospital discharge [more than 10 fold increase noted in AST and ALT] concerning for hepatocellular injury of unclear etiology (?  Medication induced)  -Although liver would be likely safe for metastatic spread in this case,  however recent staging CT abdomen pelvis as above not consistent with hepatic metastatic disease.   -Serum chromogranin levels are reported normal.

## 2024-06-18 ENCOUNTER — LAB (OUTPATIENT)
Dept: LAB | Facility: HOSPITAL | Age: 24
End: 2024-06-18
Payer: MEDICAID

## 2024-06-18 ENCOUNTER — CONSULT (OUTPATIENT)
Dept: ONCOLOGY | Facility: CLINIC | Age: 24
End: 2024-06-18
Payer: MEDICAID

## 2024-06-18 VITALS
WEIGHT: 222.2 LBS | HEIGHT: 64 IN | HEART RATE: 92 BPM | BODY MASS INDEX: 37.94 KG/M2 | SYSTOLIC BLOOD PRESSURE: 109 MMHG | OXYGEN SATURATION: 96 % | TEMPERATURE: 98.1 F | DIASTOLIC BLOOD PRESSURE: 73 MMHG

## 2024-06-18 DIAGNOSIS — R53.83 FATIGUE, UNSPECIFIED TYPE: ICD-10-CM

## 2024-06-18 DIAGNOSIS — R74.01 TRANSAMINITIS: ICD-10-CM

## 2024-06-18 DIAGNOSIS — D3A.8 NEUROENDOCRINE NEOPLASM OF APPENDIX: ICD-10-CM

## 2024-06-18 DIAGNOSIS — K64.9 HEMORRHOIDS, UNSPECIFIED HEMORRHOID TYPE: ICD-10-CM

## 2024-06-18 DIAGNOSIS — D64.9 ANEMIA, UNSPECIFIED TYPE: ICD-10-CM

## 2024-06-18 DIAGNOSIS — D64.9 ANEMIA, UNSPECIFIED TYPE: Primary | ICD-10-CM

## 2024-06-18 LAB
ALBUMIN SERPL-MCNC: 4.6 G/DL (ref 3.5–5.2)
ALBUMIN/GLOB SERPL: 1.5 G/DL
ALP SERPL-CCNC: 135 U/L (ref 39–117)
ALT SERPL W P-5'-P-CCNC: 585 U/L (ref 1–41)
ANION GAP SERPL CALCULATED.3IONS-SCNC: 11.3 MMOL/L (ref 5–15)
AST SERPL-CCNC: 188 U/L (ref 1–40)
BASOPHILS # BLD AUTO: 0 10*3/MM3 (ref 0–0.2)
BASOPHILS NFR BLD AUTO: 0 % (ref 0–1.5)
BILIRUB SERPL-MCNC: 0.5 MG/DL (ref 0–1.2)
BUN SERPL-MCNC: 7 MG/DL (ref 6–20)
BUN/CREAT SERPL: 7.3 (ref 7–25)
CALCIUM SPEC-SCNC: 9.6 MG/DL (ref 8.6–10.5)
CHLORIDE SERPL-SCNC: 107 MMOL/L (ref 98–107)
CO2 SERPL-SCNC: 19.7 MMOL/L (ref 22–29)
CREAT SERPL-MCNC: 0.96 MG/DL (ref 0.76–1.27)
DEPRECATED RDW RBC AUTO: 47.7 FL (ref 37–54)
EGFRCR SERPLBLD CKD-EPI 2021: 113.2 ML/MIN/1.73
EOSINOPHIL # BLD AUTO: 0.01 10*3/MM3 (ref 0–0.4)
EOSINOPHIL NFR BLD AUTO: 0.2 % (ref 0.3–6.2)
ERYTHROCYTE [DISTWIDTH] IN BLOOD BY AUTOMATED COUNT: 15.1 % (ref 12.3–15.4)
FERRITIN SERPL-MCNC: 59 NG/ML (ref 30–400)
GLOBULIN UR ELPH-MCNC: 3 GM/DL
GLUCOSE SERPL-MCNC: 96 MG/DL (ref 65–99)
HCT VFR BLD AUTO: 38.2 % (ref 37.5–51)
HGB BLD-MCNC: 12.3 G/DL (ref 13–17.7)
IRON 24H UR-MRATE: 96 MCG/DL (ref 59–158)
IRON SATN MFR SERPL: 20 % (ref 20–50)
LYMPHOCYTES # BLD AUTO: 2.07 10*3/MM3 (ref 0.7–3.1)
LYMPHOCYTES NFR BLD AUTO: 39.4 % (ref 19.6–45.3)
MCH RBC QN AUTO: 28.4 PG (ref 26.6–33)
MCHC RBC AUTO-ENTMCNC: 32.2 G/DL (ref 31.5–35.7)
MCV RBC AUTO: 88.2 FL (ref 79–97)
MONOCYTES # BLD AUTO: 0.41 10*3/MM3 (ref 0.1–0.9)
MONOCYTES NFR BLD AUTO: 7.8 % (ref 5–12)
NEUTROPHILS NFR BLD AUTO: 2.77 10*3/MM3 (ref 1.7–7)
NEUTROPHILS NFR BLD AUTO: 52.6 % (ref 42.7–76)
PLATELET # BLD AUTO: 322 10*3/MM3 (ref 140–450)
PMV BLD AUTO: 8.8 FL (ref 6–12)
POTASSIUM SERPL-SCNC: 4 MMOL/L (ref 3.5–5.2)
PROT SERPL-MCNC: 7.6 G/DL (ref 6–8.5)
RBC # BLD AUTO: 4.33 10*6/MM3 (ref 4.14–5.8)
RETICS # AUTO: 0.08 10*6/MM3 (ref 0.02–0.13)
RETICS/RBC NFR AUTO: 1.75 % (ref 0.7–1.9)
SODIUM SERPL-SCNC: 138 MMOL/L (ref 136–145)
TIBC SERPL-MCNC: 483 MCG/DL (ref 298–536)
TRANSFERRIN SERPL-MCNC: 324 MG/DL (ref 200–360)
WBC NRBC COR # BLD AUTO: 5.26 10*3/MM3 (ref 3.4–10.8)

## 2024-06-18 PROCEDURE — 85025 COMPLETE CBC W/AUTO DIFF WBC: CPT

## 2024-06-18 PROCEDURE — 36415 COLL VENOUS BLD VENIPUNCTURE: CPT

## 2024-06-18 PROCEDURE — 82746 ASSAY OF FOLIC ACID SERUM: CPT | Performed by: STUDENT IN AN ORGANIZED HEALTH CARE EDUCATION/TRAINING PROGRAM

## 2024-06-18 PROCEDURE — 85045 AUTOMATED RETICULOCYTE COUNT: CPT | Performed by: STUDENT IN AN ORGANIZED HEALTH CARE EDUCATION/TRAINING PROGRAM

## 2024-06-18 PROCEDURE — 83540 ASSAY OF IRON: CPT | Performed by: STUDENT IN AN ORGANIZED HEALTH CARE EDUCATION/TRAINING PROGRAM

## 2024-06-18 PROCEDURE — 80053 COMPREHEN METABOLIC PANEL: CPT | Performed by: STUDENT IN AN ORGANIZED HEALTH CARE EDUCATION/TRAINING PROGRAM

## 2024-06-18 PROCEDURE — 84466 ASSAY OF TRANSFERRIN: CPT | Performed by: STUDENT IN AN ORGANIZED HEALTH CARE EDUCATION/TRAINING PROGRAM

## 2024-06-18 PROCEDURE — 82607 VITAMIN B-12: CPT | Performed by: STUDENT IN AN ORGANIZED HEALTH CARE EDUCATION/TRAINING PROGRAM

## 2024-06-18 PROCEDURE — 82728 ASSAY OF FERRITIN: CPT | Performed by: STUDENT IN AN ORGANIZED HEALTH CARE EDUCATION/TRAINING PROGRAM

## 2024-06-19 ENCOUNTER — PATIENT ROUNDING (BHMG ONLY) (OUTPATIENT)
Dept: ONCOLOGY | Facility: CLINIC | Age: 24
End: 2024-06-19
Payer: MEDICAID

## 2024-06-19 LAB
FOLATE SERPL-MCNC: 3.53 NG/ML (ref 4.78–24.2)
VIT B12 BLD-MCNC: 868 PG/ML (ref 211–946)

## 2024-06-19 NOTE — PROGRESS NOTES
June 19, 2024    Hello, may I speak with Damian Casillas?    My name is Natalie Selby      I am  with [facility]  [address] [place] [address] [phone].    Before we get started may I verify your date of birth? 2000    I am calling to officially welcome you to our practice and ask about your recent visit. Is this a good time to talk? no    Tell me about your visit with us. What things went well?  A My Chart message was sent to the patient.         We're always looking for ways to make our patients' experiences even better. Do you have recommendations on ways we may improve?  no    Overall were you satisfied with your first visit to our practice? yes       I appreciate you taking the time to speak with me today. Is there anything else I can do for you? no      Thank you, and have a great day.

## 2024-06-20 LAB — CGA SERPL-MCNC: 31.9 NG/ML (ref 0–101.8)

## 2024-06-21 ENCOUNTER — TELEPHONE (OUTPATIENT)
Dept: ONCOLOGY | Facility: CLINIC | Age: 24
End: 2024-06-21
Payer: MEDICAID

## 2024-06-21 NOTE — TELEPHONE ENCOUNTER
Phoned pt and informed him Dr. Ly advises he go to the ED and have his CMP repeated to check his elevated   recent APPENDECTOMY LAPAROSCOPIC. Dx:  NEUROENDOCRINE TUMOR. Pt states he does not have transportation and I advised he call an ambulance. He stated he would call and ambulance and go to [facility] ED.

## 2024-07-05 ENCOUNTER — HOSPITAL ENCOUNTER (EMERGENCY)
Facility: HOSPITAL | Age: 24
Discharge: HOME | End: 2024-07-05
Payer: MEDICAID

## 2024-07-05 VITALS
HEART RATE: 95 BPM | RESPIRATION RATE: 15 BRPM | SYSTOLIC BLOOD PRESSURE: 108 MMHG | WEIGHT: 210.54 LBS | DIASTOLIC BLOOD PRESSURE: 55 MMHG | TEMPERATURE: 99.2 F | HEIGHT: 64 IN | BODY MASS INDEX: 35.94 KG/M2 | OXYGEN SATURATION: 96 %

## 2024-07-05 DIAGNOSIS — R11.2 NAUSEA AND VOMITING, UNSPECIFIED VOMITING TYPE: Primary | ICD-10-CM

## 2024-07-05 DIAGNOSIS — N39.0 URINARY TRACT INFECTION WITH HEMATURIA, SITE UNSPECIFIED: ICD-10-CM

## 2024-07-05 DIAGNOSIS — R31.9 URINARY TRACT INFECTION WITH HEMATURIA, SITE UNSPECIFIED: ICD-10-CM

## 2024-07-05 LAB
ALBUMIN SERPL-MCNC: 4.8 G/DL (ref 3.5–5.2)
ALBUMIN/GLOB SERPL: 1.5 G/DL
ALP SERPL-CCNC: 140 U/L (ref 39–117)
ALT SERPL W P-5'-P-CCNC: 42 U/L (ref 1–41)
ANION GAP SERPL CALCULATED.3IONS-SCNC: 13.3 MMOL/L (ref 5–15)
AST SERPL-CCNC: 37 U/L (ref 1–40)
B-HCG UR QL: NEGATIVE
BACTERIA UR QL AUTO: ABNORMAL /HPF
BASOPHILS # BLD AUTO: 0 10*3/MM3 (ref 0–0.2)
BASOPHILS NFR BLD AUTO: 0 % (ref 0–1.5)
BILIRUB SERPL-MCNC: 0.7 MG/DL (ref 0–1.2)
BILIRUB UR QL STRIP: ABNORMAL
BUN SERPL-MCNC: 10 MG/DL (ref 6–20)
BUN/CREAT SERPL: 10.3 (ref 7–25)
CALCIUM SPEC-SCNC: 10 MG/DL (ref 8.6–10.5)
CHLORIDE SERPL-SCNC: 104 MMOL/L (ref 98–107)
CLARITY UR: CLEAR
CO2 SERPL-SCNC: 20.7 MMOL/L (ref 22–29)
COLOR UR: ABNORMAL
CREAT SERPL-MCNC: 0.97 MG/DL (ref 0.76–1.27)
DEPRECATED RDW RBC AUTO: 52.5 FL (ref 37–54)
EGFRCR SERPLBLD CKD-EPI 2021: 111.8 ML/MIN/1.73
EOSINOPHIL # BLD AUTO: 0 10*3/MM3 (ref 0–0.4)
EOSINOPHIL NFR BLD AUTO: 0 % (ref 0.3–6.2)
ERYTHROCYTE [DISTWIDTH] IN BLOOD BY AUTOMATED COUNT: 15.9 % (ref 12.3–15.4)
GLOBULIN UR ELPH-MCNC: 3.1 GM/DL
GLUCOSE SERPL-MCNC: 97 MG/DL (ref 65–99)
GLUCOSE UR STRIP-MCNC: NEGATIVE MG/DL
HCT VFR BLD AUTO: 41.1 % (ref 37.5–51)
HGB BLD-MCNC: 13 G/DL (ref 13–17.7)
HGB UR QL STRIP.AUTO: ABNORMAL
HYALINE CASTS UR QL AUTO: ABNORMAL /LPF
IMM GRANULOCYTES # BLD AUTO: 0.01 10*3/MM3 (ref 0–0.05)
IMM GRANULOCYTES NFR BLD AUTO: 0.2 % (ref 0–0.5)
KETONES UR QL STRIP: NEGATIVE
LEUKOCYTE ESTERASE UR QL STRIP.AUTO: ABNORMAL
LIPASE SERPL-CCNC: 32 U/L (ref 13–60)
LYMPHOCYTES # BLD AUTO: 1.12 10*3/MM3 (ref 0.7–3.1)
LYMPHOCYTES NFR BLD AUTO: 23.6 % (ref 19.6–45.3)
MCH RBC QN AUTO: 28.7 PG (ref 26.6–33)
MCHC RBC AUTO-ENTMCNC: 31.6 G/DL (ref 31.5–35.7)
MCV RBC AUTO: 90.7 FL (ref 79–97)
MONOCYTES # BLD AUTO: 0.27 10*3/MM3 (ref 0.1–0.9)
MONOCYTES NFR BLD AUTO: 5.7 % (ref 5–12)
NEUTROPHILS NFR BLD AUTO: 3.34 10*3/MM3 (ref 1.7–7)
NEUTROPHILS NFR BLD AUTO: 70.5 % (ref 42.7–76)
NITRITE UR QL STRIP: POSITIVE
NRBC BLD AUTO-RTO: 0 /100 WBC (ref 0–0.2)
PH UR STRIP.AUTO: 6 [PH] (ref 5–8)
PLATELET # BLD AUTO: 281 10*3/MM3 (ref 140–450)
PMV BLD AUTO: 9.6 FL (ref 6–12)
POTASSIUM SERPL-SCNC: 3.5 MMOL/L (ref 3.5–5.2)
PROT SERPL-MCNC: 7.9 G/DL (ref 6–8.5)
PROT UR QL STRIP: NEGATIVE
RBC # BLD AUTO: 4.53 10*6/MM3 (ref 4.14–5.8)
RBC # UR STRIP: ABNORMAL /HPF
REF LAB TEST METHOD: ABNORMAL
SODIUM SERPL-SCNC: 138 MMOL/L (ref 136–145)
SP GR UR STRIP: 1.01 (ref 1–1.03)
SQUAMOUS #/AREA URNS HPF: ABNORMAL /HPF
UROBILINOGEN UR QL STRIP: ABNORMAL
WBC # UR STRIP: ABNORMAL /HPF
WBC NRBC COR # BLD AUTO: 4.74 10*3/MM3 (ref 3.4–10.8)

## 2024-07-05 PROCEDURE — 81001 URINALYSIS AUTO W/SCOPE: CPT | Performed by: NURSE PRACTITIONER

## 2024-07-05 PROCEDURE — 99283 EMERGENCY DEPT VISIT LOW MDM: CPT

## 2024-07-05 PROCEDURE — 85025 COMPLETE CBC W/AUTO DIFF WBC: CPT | Performed by: NURSE PRACTITIONER

## 2024-07-05 PROCEDURE — 36415 COLL VENOUS BLD VENIPUNCTURE: CPT

## 2024-07-05 PROCEDURE — 25810000003 LACTATED RINGERS SOLUTION: Performed by: NURSE PRACTITIONER

## 2024-07-05 PROCEDURE — 80053 COMPREHEN METABOLIC PANEL: CPT | Performed by: NURSE PRACTITIONER

## 2024-07-05 PROCEDURE — 83690 ASSAY OF LIPASE: CPT | Performed by: NURSE PRACTITIONER

## 2024-07-05 PROCEDURE — 81025 URINE PREGNANCY TEST: CPT | Performed by: NURSE PRACTITIONER

## 2024-07-05 PROCEDURE — 99285 EMERGENCY DEPT VISIT HI MDM: CPT

## 2024-07-05 PROCEDURE — 96374 THER/PROPH/DIAG INJ IV PUSH: CPT

## 2024-07-05 RX ORDER — SODIUM CHLORIDE 0.9 % (FLUSH) 0.9 %
10 SYRINGE (ML) INJECTION AS NEEDED
Status: DISCONTINUED | OUTPATIENT
Start: 2024-07-05 | End: 2024-07-05 | Stop reason: HOSPADM

## 2024-07-05 RX ORDER — AMOXICILLIN AND CLAVULANATE POTASSIUM 875; 125 MG/1; MG/1
1 TABLET, FILM COATED ORAL 2 TIMES DAILY
Qty: 14 TABLET | Refills: 0 | Status: SHIPPED | OUTPATIENT
Start: 2024-07-05 | End: 2024-07-12

## 2024-07-05 RX ORDER — FAMOTIDINE 10 MG/ML
20 INJECTION, SOLUTION INTRAVENOUS ONCE
Status: COMPLETED | OUTPATIENT
Start: 2024-07-05 | End: 2024-07-05

## 2024-07-05 RX ADMIN — FAMOTIDINE 20 MG: 10 INJECTION INTRAVENOUS at 10:58

## 2024-07-05 RX ADMIN — SODIUM CHLORIDE, POTASSIUM CHLORIDE, SODIUM LACTATE AND CALCIUM CHLORIDE 500 ML: 600; 310; 30; 20 INJECTION, SOLUTION INTRAVENOUS at 11:06

## 2024-07-05 NOTE — DISCHARGE INSTRUCTIONS
Take antibiotics as prescribed.  Drink plenty of fluids.  Continue current home medications.  Follow-up with your primary care provider.  Return for new or worsening symptoms.

## 2024-07-05 NOTE — ED PROVIDER NOTES
Subjective   History of Present Illness  Patient is a 24-year-old transgender female, identifying as male, presents today from home accompanied by mother with complaints of nausea vomiting this morning.  This started after the patient took Azo for some urinary frequency urgency.  Reports that upon the third episode of vomiting noticed some dark-colored gritty appearing emesis and then some bright red blood.  Denies black or bloody stool.  Complains of some epigastric upper abdominal discomfort.  No fever chest pain shortness of breath.  Does report a long history of anxiety also history of GERD.      Review of Systems   Constitutional:  Negative for fever.   Respiratory:  Negative for shortness of breath.    Cardiovascular:  Negative for chest pain.   Gastrointestinal:  Positive for abdominal pain, nausea and vomiting. Negative for blood in stool.   Genitourinary:  Positive for dysuria and urgency.       Past Medical History:   Diagnosis Date    ADHD     Allergic rhinitis     Anemia     Arthritis     Asthma     Autism     level 2    Bipolar 1 disorder     Depression     Depressive disorder     Dyslipidemia     Dysmenorrhea     Endocrine disorder     Environmental allergies     Fatigue     Fibrocystic breast     Fibromyalgia     Gender dysphoria     GERD (gastroesophageal reflux disease)     History of MRSA infection     History of suicide attempt     Hyperchloremia     Hyperlordosis deformity of lumbar spine     Hypertriglyceridemia     Hypovitaminosis D     Overactive bladder     Panic     PTSD (post-traumatic stress disorder)     Reactive attachment disorder     TMJ (dislocation of temporomandibular joint)     Vertigo        Allergies   Allergen Reactions    Cariprazine Nausea And Vomiting, Palpitations and Shortness Of Breath    Fluoxetine Hcl Other (See Comments)    Lamictal [Lamotrigine] Nausea Only    Latex Unknown - Low Severity    Fluoxetine Rash    Testosterone Cypionate Itching, Rash and Swelling        Past Surgical History:   Procedure Laterality Date    APPENDECTOMY N/A 5/27/2024    Procedure: APPENDECTOMY LAPAROSCOPIC;  Surgeon: Ming Woodruff MD;  Location: [facility] MAIN OR;  Service: General;  Laterality: N/A;    COLONOSCOPY  09/01/2023    ENDOSCOPY  09/01/2023    MASS EXCISION Left     arm    NECK MASS EXCISION         Family History   Problem Relation Age of Onset    No Known Problems Mother     Mental illness Father     Asthma Father     Heart disease Father        Social History     Socioeconomic History    Marital status: Single   Tobacco Use    Smoking status: Never     Passive exposure: Past    Smokeless tobacco: Never    Tobacco comments:     Vapes every other day   Vaping Use    Vaping status: Some Days    Substances: THC, CBD    Devices: Disposable   Substance and Sexual Activity    Alcohol use: No    Drug use: No    Sexual activity: Defer           Objective   Physical Exam  Vital signs and triage nurse note reviewed.  Constitutional: Awake, alert; well-developed and well-nourished. No acute distress is noted.  Appears mildly anxious.  HEENT: Normocephalic, atraumatic; pupils are PERRL with intact EOM; oropharynx is pink and moist without exudate or erythema.  No drooling or pooling of oral secretions.  Neck: Supple, full range of motion without pain; no cervical lymphadenopathy. Normal phonation.  Cardiovascular: Regular rate and rhythm, normal S1-S2.  No murmur noted.  Pulmonary: Respiratory effort regular nonlabored, breath sounds clear to auscultation all fields.  Abdomen: Soft, mildly tender over the epigastrium, nondistended with normoactive bowel sounds; no rebound or guarding.  Musculoskeletal: Independent range of motion of all extremities with no palpable tenderness or edema.  Neuro: Alert oriented x3, speech is clear and appropriate, GCS 15.    Skin: Flesh tone, warm, dry, intact; no erythematous or petechial rash or lesion.    Procedures           ED Course  ED Course as of  07/05/24 1310   Fri Jul 05, 2024   1016 Patient reports remoted history of SI.  No current SI thoughts.  SI precautions discontinued. [MD]      ED Course User Index  [MD] Chela Pruitt APRN      Labs Reviewed   COMPREHENSIVE METABOLIC PANEL - Abnormal; Notable for the following components:       Result Value    CO2 20.7 (*)     ALT (SGPT) 42 (*)     Alkaline Phosphatase 140 (*)     All other components within normal limits    Narrative:     GFR Normal >60  Chronic Kidney Disease <60  Kidney Failure <15     URINALYSIS W/ MICROSCOPIC IF INDICATED (NO CULTURE) - Abnormal; Notable for the following components:    Color, UA Orange (*)     Bilirubin, UA Small (1+) (*)     Blood, UA Moderate (2+) (*)     Leuk Esterase, UA Moderate (2+) (*)     Nitrite, UA Positive (*)     All other components within normal limits   CBC WITH AUTO DIFFERENTIAL - Abnormal; Notable for the following components:    RDW 15.9 (*)     Eosinophil % 0.0 (*)     All other components within normal limits   URINALYSIS, MICROSCOPIC ONLY - Abnormal; Notable for the following components:    RBC, UA Too Numerous to Count (*)     Squamous Epithelial Cells, UA 3-6 (*)     All other components within normal limits   LIPASE - Normal   PREGNANCY, URINE - Normal   CBC AND DIFFERENTIAL    Narrative:     The following orders were created for panel order CBC & Differential.  Procedure                               Abnormality         Status                     ---------                               -----------         ------                     CBC Auto Differential[[id number]]        Abnormal            Final result                 Please view results for these tests on the individual orders.     No radiology results for the last day  Medications   sodium chloride 0.9 % flush 10 mL (has no administration in time range)   famotidine (PEPCID) injection 20 mg (20 mg Intravenous Given 7/5/24 1058)   lactated ringers bolus 500 mL (0 mL Intravenous Stopped 7/5/24  4002)                                            Medical Decision Making  Patient presents today with the above complaint.    She had the above exam and evaluation.  IV was established.  Labs were obtained.  Was given a small fluid bolus as well as IV Pepcid.  Reports nausea has since resolved.    Workup: CBC and metabolic panel are grossly unremarkable.  Urine hCG is negative.  Lipase within normal limits at 32.  Urinalysis is significant for orange urine with small bilirubin, moderate blood, moderate leukocytes, positive nitrates, too numerous to count RBCs and 3-6 squamous cells.    On reexamination patient resting quietly no distress.  No new complaints.  Afebrile and hemodynamically stable.  Well-appearing.  Improved symptoms.    Findings were discussed with patient.  Will be discharged home with prescription for Augmentin for UTI though feel urinalysis is likely skewed due to Azo use.  However patient is symptomatic.  Instructed to follow-up with primary care provider but was given warning signs for prompt return to the ED and she voiced understanding.    Amount and/or Complexity of Data Reviewed  Labs: ordered.    Risk  Prescription drug management.        Final diagnoses:   Nausea and vomiting, unspecified vomiting type   Urinary tract infection with hematuria, site unspecified       ED Disposition  ED Disposition       ED Disposition   Discharge    Condition   Stable    Comment   --               Jackie Uriarte  [address] [facility] [place] [address] [phone]               Medication List        New Prescriptions      amoxicillin-clavulanate 875-125 MG per tablet  Commonly known as: AUGMENTIN  Take 1 tablet by mouth 2 (Two) Times a Day for 7 days.               Where to Get Your Medications        These medications were sent to [id number] - [place], [place] - [address] [facility] [address] [facility] - [phone]  - [phone] FX  [address] [facility] [place] [address]      Phone:  [phone]   amoxicillin-clavulanate 875-125 MG per tablet            Chela Pruitt, APRN  07/05/24 0885

## 2024-07-05 NOTE — NURSING NOTE
PT states that he had suicidal thoughts in , and overdosed and almost  from vomiting blood.    Charge nurse notified.

## 2024-07-05 NOTE — ED NOTES
This writer monitoring pt continuously for safety, per provider suicide precautions may be discontinued, pt placed in regular hospital gown and secured items (room securement was not complete before pt placed and provider informed this writer that pt will have suicide precautions Dc'd shortly) were then returned to the pt's room.

## 2024-07-05 NOTE — ED NOTES
DAYLIN Suero seeing this patient. This writer informed that suicide precautions may be discontinued, documentation from provider will be placed. This writer has been observing pt for safety, security officers not available at this time.

## 2024-07-05 NOTE — ED NOTES
Pt placed in room by triage, communication error resulted in patient placement before SI precautions in place. This writer preparing room for SI precautions at this time.

## 2024-07-11 ENCOUNTER — TELEPHONE (OUTPATIENT)
Dept: SURGERY | Facility: CLINIC | Age: 24
End: 2024-07-11
Payer: MEDICAID

## 2024-07-11 NOTE — TELEPHONE ENCOUNTER
Pt called back informed pt it was likely the [facility] trying to get ahold of him to [facility] for Dr. Yin. Informed we are in the same office with the same number. And Dr. Ly referred him to Dr. Yin. Pt understood and will call that number that called him to [facility] a new pt appt with Dr. Yin.

## 2024-07-11 NOTE — TELEPHONE ENCOUNTER
----- Message from [facility] [employer] sent at 7/10/2024  8:38 PM EDT -----  Regarding: ??????????  Contact: [phone]  (If I COULD show emotions, you'd see me doing nothing but screaming in all of the pain I'm ALREADY in.)   Addended by: SUNNY CESAR on: 3/19/2024 04:14 PM     Modules accepted: Orders

## 2024-07-12 ENCOUNTER — HOSPITAL ENCOUNTER (OUTPATIENT)
Dept: PET IMAGING | Facility: HOSPITAL | Age: 24
Discharge: HOME | End: 2024-07-12
Payer: MEDICAID

## 2024-07-12 DIAGNOSIS — D3A.8 NEUROENDOCRINE NEOPLASM OF APPENDIX: ICD-10-CM

## 2024-07-12 PROCEDURE — 25510000001 IOPAMIDOL PER 1 ML: Performed by: STUDENT IN AN ORGANIZED HEALTH CARE EDUCATION/TRAINING PROGRAM

## 2024-07-12 PROCEDURE — 71260 CT THORAX DX C+: CPT

## 2024-07-12 RX ADMIN — IOPAMIDOL 100 ML: 755 INJECTION, SOLUTION INTRAVENOUS at 14:47

## 2024-07-12 NOTE — PROGRESS NOTES
HEMATOLOGY ONCOLOGY OUTPATIENT FOLLOW UP       Patient name: Damian Casillas  : 2000  MRN: [id number]  Primary Care Physician: Jackie Uriarte  Referring Physician: No ref. provider found  Reason For Consult:       History of Present Illness:  Patient is a 24 y.o. adult born female but identifies as male with a CMH of ADHD, asthma, autism, bipolar 1 disorder, depression, anxiety, dyslipidemia, obesity, overactive bladder, PTSD, gender dysphoria has been on testosterone for 2 years scheduled for breast removal surgery in August and hysterectomy for female to male transition who presented to [facility] upon transfer from [facility] emergency department on 2024 where he presented with complaints of right lower and mid abdominal pain.    He reported he has IBS and has chronic pain however this intensified about 8 AM this morning after a bowel movement.  He reports he took an Imodium and a  laxative suppository this morning.  He reports a bowel movement was loose .  He reports frequent bloody stool and has had a colonoscopy in the past was told he had IBS.  He also reports mucus in the stool.  He reports subjective fever but is afebrile here.  He reported nausea at home and forced himself to vomit without hematemesis.  He reports urinary frequency which is not uncommon for him but denies any dysuria.   Labs from outlying facility show urine drug screen positive for cannabinoids and tricyclic antidepressants.  Lipase was 111, lactic acid 2.3,Labs showed an ALT of 70 AST of 45 albumin 5.1, WBC 13.8 calcium 10.7. [facility].  He was given a one-time dose of IV Zosyn in the emergency department.     24: CT ABDOMEN/PELVIS W CONTRAST:  Impression:  There are findings that would suggest early acute appendicitis.      24: Appendix, appendectomy:  Final Diagnosis    Well-differentiated neuroendocrine tumor, G1 (pT3, pN, see synoptic report and comment)    Tumor extends to subserosa    All surgical margins negative for tumor    Acute appendicitis  Comment    A low-grade tumor showing a nested pattern is present in the distal aspect of the appendix.  Immunohistochemistry was performed using appropriate controls and the tumor is positive for CD56, chromogranin and synaptophysin.  The staining pattern confirms the tumor as a well-differentiated neuroendocrine tumor.  A Ki-67 immunostain displays a proliferation index of less than 3% which is consistent with a grade 1 well-differentiated neuroendocrine tumor.  All surgical margins are negative.        Has been referred to us for further evaluation and management for incidental diagnosis of neuroendocrine tumor of the appendix.    6/18/2024: Patient presents for initial consultation today.  The patient appears to have an anxious outlook and appears to be very worried/stressed out due to recent surgical findings.   The patient reported having underlying diagnosis of IBS with predominant constipation component, however he experiences intermittent fecal urgency.  As per medical history of rectal hemorrhoids and intermittent anal bleeding since 2022.  He reported that he experiences significant symptoms of heartburn for which he is taking oral PPIs.    In terms of current symptomatology, he reported having Intense abd pain off and on.  This pain appears to be chronic in nature and per patient he had undergone a colonoscopy for the symptoms in September 2023 at [facility] and he was told that he has inflammatory changes in the bowel [per patient].    Has ongoing Fatigue, patient is concerned about anemia. No iron infusions or RBC transfusions before.    7/12/2024: CT chest with contrast:  Impression:     1. No evidence of metastatic disease within the thorax.  2. Hepatic steatosis.       Subjective:  Patient seen today for follow-up  "on imaging as above.  Continues to be anxious about his surgery and overall prognosis.  No new complaints reported since last office visit.  Patient was found to have mild hematuria and UA positive for UTI about 1 week ago, he has completed course of oral antibiotics followed by improvement in symptoms.  Review of systems otherwise unremarkable.      Past Medical History:   Diagnosis Date    ADHD     Allergic rhinitis     Anemia     Arthritis     Asthma     Autism     level 2    Bipolar 1 disorder     Depression     Depressive disorder     Dyslipidemia     Dysmenorrhea     Endocrine disorder     Environmental allergies     Fatigue     Fibrocystic breast     Fibromyalgia     Gender dysphoria     GERD (gastroesophageal reflux disease)     History of MRSA infection     History of suicide attempt     Hyperchloremia     Hyperlordosis deformity of lumbar spine     Hypertriglyceridemia     Hypovitaminosis D     Overactive bladder     Panic     PTSD (post-traumatic stress disorder)     Reactive attachment disorder     TMJ (dislocation of temporomandibular joint)     Vertigo        Past Surgical History:   Procedure Laterality Date    APPENDECTOMY N/A 5/27/2024    Procedure: APPENDECTOMY LAPAROSCOPIC;  Surgeon: Ming Woodruff MD;  Location: [facility] MAIN OR;  Service: General;  Laterality: N/A;    COLONOSCOPY  09/01/2023    ENDOSCOPY  09/01/2023    MASS EXCISION Left     arm    NECK MASS EXCISION           Current Outpatient Medications:     albuterol sulfate  (90 Base) MCG/ACT inhaler, Inhale 1-2 puffs every 4-6 hours as needed for shortness of breath, Disp: , Rfl:     amoxicillin-clavulanate (AUGMENTIN) 875-125 MG per tablet, Take 1 tablet by mouth 2 (Two) Times a Day for 7 days., Disp: 14 tablet, Rfl: 0    B-D 3CC LUER-KEYLA SYR 87NC9-5/2 18G X 1-1/2\" 3 ML misc, USE TO DRAW UP MEDICATION FOR WEEKLY INJECTION, Disp: , Rfl:     B-D 3CC LUER-KEYLA SYR 25GX1\" 25G X 1\" 3 ML misc, USE FOR TESTOSTERONE INJECTION WEEKLY AS " "DIRECTED, Disp: , Rfl:     BD Hypodermic Needle 18G X 1\" misc, , Disp: , Rfl:     BD Syringe Slip Tip 25G X 5/8\" 1 ML misc, , Disp: , Rfl:     Caplyta 42 MG capsule, Take 1 capsule by mouth Daily., Disp: , Rfl:     cetirizine (zyrTEC) 10 MG tablet, Take 1 tablet by mouth As Needed for Allergies., Disp: , Rfl:     ciprofloxacin (CIPRO) 500 MG tablet, Take 1 tablet by mouth Every 12 (Twelve) Hours., Disp: , Rfl:     clonazePAM (KlonoPIN) 0.5 MG disintegrating tablet, Take 1 tablet by mouth 2 (Two) Times a Day., Disp: , Rfl:     Cyanocobalamin ([employer] B12 GUMMIES PO), Take  by mouth., Disp: , Rfl:     cyclobenzaprine (FLEXERIL) 10 MG tablet, Take 1 tablet by mouth every night at bedtime., Disp: , Rfl:     dicyclomine (BENTYL) 10 MG capsule, Take 1 capsule by mouth 4 (Four) Times a Day Before Meals & at Bedtime., Disp: , Rfl:     DULoxetine (CYMBALTA) 20 MG capsule, Take 1 capsule by mouth Daily. Pt also takes 30 mg at hs, Disp: , Rfl:     gabapentin (NEURONTIN) 300 MG capsule, Take 1 capsule by mouth 3 (Three) Times a Day., Disp: , Rfl:     Gemtesa 75 MG tablet, Take 1 tablet by mouth Daily., Disp: , Rfl:     hydrOXYzine (ATARAX) 25 MG tablet, , Disp: , Rfl:     omeprazole (priLOSEC) 40 MG capsule, Take 1 capsule by mouth 2 (Two) Times a Day., Disp: , Rfl:     propranolol (INDERAL) 10 MG tablet, Take 1 tablet by mouth 2 (Two) Times a Day., Disp: , Rfl:     Testosterone Enanthate 200 MG/ML solution, INJECT 0.25ML IN THE MUSCLE EVERY WEEK, Disp: , Rfl:   No current facility-administered medications for this visit.    Allergies   Allergen Reactions    Cariprazine Nausea And Vomiting, Palpitations and Shortness Of Breath    Fluoxetine Hcl Other (See Comments)    Lamictal [Lamotrigine] Nausea Only    Latex Unknown - Low Severity    Fluoxetine Rash    Testosterone Cypionate Itching, Rash and Swelling       Family History   Problem Relation Age of Onset    No Known Problems Mother     Mental illness Father     Asthma Father     " "Heart disease Father        Cancer-related family history is not on file.      Social History     Tobacco Use    Smoking status: Never     Passive exposure: Past    Smokeless tobacco: Never    Tobacco comments:     Vapes every other day   Vaping Use    Vaping status: Some Days    Substances: THC, CBD    Devices: Disposable   Substance Use Topics    Alcohol use: No    Drug use: No     Social History     Social History Narrative    Not on file       ROS:   Review of Systems   Constitutional:  Positive for fatigue.   Gastrointestinal:  Positive for abdominal pain, blood in stool and constipation.   Genitourinary:  Positive for dysuria.   Musculoskeletal:  Positive for arthralgias.         Objective:    Vital Signs:  Vitals:    07/15/24 1456   BP: 116/80   Pulse: 88   SpO2: 95%   Weight: 93.6 kg (206 lb 6.4 oz)   Height: 162.6 cm (64\")   PainSc: 0-No pain       Body mass index is 35.43 kg/m².    ECOG  (1) Restricted in physically strenuous activity, ambulatory and able to do work of light nature    Physical Exam:   Physical Exam  Constitutional:       Appearance: Normal appearance. He is normal weight.   HENT:      Head: Normocephalic and atraumatic.      Right Ear: External ear normal.      Left Ear: External ear normal.      Nose: Nose normal.      Mouth/Throat:      Mouth: Mucous membranes are moist.      Pharynx: Oropharynx is clear.   Eyes:      Extraocular Movements: Extraocular movements intact.      Conjunctiva/sclera: Conjunctivae normal.      Pupils: Pupils are equal, round, and reactive to light.   Cardiovascular:      Rate and Rhythm: Normal rate.      Pulses: Normal pulses.   Pulmonary:      Effort: Pulmonary effort is normal.   Abdominal:      General: Abdomen is flat.      Palpations: Abdomen is soft.   Musculoskeletal:         General: Normal range of motion.      Cervical back: Normal range of motion and neck supple.   Skin:     General: Skin is warm.   Neurological:      General: No focal deficit " "present.      Mental Status: He is alert and oriented to person, place, and time.   Psychiatric:         Mood and Affect: Mood is anxious.         Speech: Speech is rapid and pressured and tangential.         Judgment: Judgment normal.         Lab Results - Last 18 Months   Lab Units 07/05/24  1026 06/18/24  1546 05/26/24  2303   WBC 10*3/mm3 4.74 5.26 9.49   HEMOGLOBIN g/dL 13.0 12.3* 11.4*   HEMATOCRIT % 41.1 38.2 35.6*   PLATELETS 10*3/mm3 281 322 283   MCV fL 90.7 88.2 86.0     Lab Results - Last 18 Months   Lab Units 07/05/24  1026 06/18/24  1546 05/27/24  0615 05/26/24  2303 01/27/24  1152   SODIUM mmol/L 138 138  --  142 141   POTASSIUM mmol/L 3.5 4.0 3.5 3.4* 3.7   CHLORIDE mmol/L 104 107  --  110* 105   CO2 mmol/L 20.7* 19.7*  --  21.6* 27.0   BUN mg/dL 10 7  --  7 11   CREATININE mg/dL 0.97 0.96  --  0.97 0.93   CALCIUM mg/dL 10.0 9.6  --  9.0 9.5   BILIRUBIN mg/dL 0.7 0.5  --   --  0.4   ALK PHOS U/L 140* 135*  --   --  118*   ALT (SGPT) U/L 42* 585*  --   --  40   AST (SGOT) U/L 37 188*  --   --  22   GLUCOSE mg/dL 97 96  --  121* 87       Lab Results   Component Value Date    GLUCOSE 97 07/05/2024    BUN 10 07/05/2024    CREATININE 0.97 07/05/2024    EGFRIFAFRI >60 02/02/2023    BCR 10.3 07/05/2024    K 3.5 07/05/2024    CO2 20.7 (L) 07/05/2024    CALCIUM 10.0 07/05/2024    ALBUMIN 4.8 07/05/2024    LABIL2 1.5 02/02/2023    AST 37 07/05/2024    ALT 42 (H) 07/05/2024       No results for input(s): \"APTT\", \"INR\", \"PTT\" in the last 30136 hours.    Lab Results   Component Value Date    IRON 96 06/18/2024    TIBC 483 06/18/2024    FERRITIN 59.00 06/18/2024       Lab Results   Component Value Date    FOLATE 3.53 (L) 06/18/2024       No results found for: \"OCCULTBLD\"    Lab Results   Component Value Date    RETICCTPCT 1.75 06/18/2024     Lab Results   Component Value Date    VODNGRKR16 868 06/18/2024     No results found for: \"SPEP\", \"UPEP\"  No results found for: \"LDH\", \"URICACID\"  Lab Results   Component " "Value Date    SEDRATE 25 (H) 01/27/2024     No results found for: \"FIBRINOGEN\", \"HAPTOGLOBIN\"  No results found for: \"PTT\", \"INR\"  No results found for: \"\"  No results found for: \"CEA\"  No components found for: \"CA-19-9\"  No results found for: \"PSA\"  Lab Results   Component Value Date    SEDRATE 25 (H) 01/27/2024          Synoptic Checklist  APPENDIX NEUROENDOCRINE TUMOR (APPENDIX NEUROENDOCRINE TUMOR - All Specimens) 9th Edition - Protocol posted: 12/13/2023  SPECIMEN  Procedure Appendectomy  .  TUMOR  Tumor Site Distal half of appendix  Histologic Type and Grade G1, well-differentiated neuroendocrine tumor  Histologic Grade Determination  Ki-67 Labeling Index Less than 3%  Tumor Size Greatest Dimension (Centimeters): 0.9 cm  Tumor Extent Invades subserosa or mesoappendix without involvement of visceral  peritoneum  Lymphatic and / or Vascular Invasion Not identified  Perineural Invasion Not identified  .  MARGINS  Margin Status All margins negative for tumor  Closest Margin(s) to Tumor Proximal  Distance from Tumor to Closest Margin Greater than 1 cm  .  REGIONAL LYMPH NODES  Regional Lymph Node Status Not applicable (no regional lymph nodes submitted or found)  .  pTNM CLASSIFICATION (AJCC Version 9)  Reporting of pT, pN, and (when applicable) pM categories is based on information available to the pathologist at the time the report is issued. As  per the AJCC (Chapter 1, 8th Ed.) it is the managing physician’s responsibility to establish the final pathologic stage based upon all pertinent  information, including but potentially not limited to this pathology report.  pT Category pT3  pN Category pN not assigned (no nodes submitted or found)  Page: 1      Assessment & Plan     Appendiceal neuroendocrine tumor: pT3Nx  -This is an incidental finding on appendectomy for suspected appendicitis.  -Discussed the diagnosis, pathophysiology of neuroendocrine tumors and recommended treatment approach for appendiceal " neuroendocrine tumors.  The patient was explained that these tumors are quite uncommon and treatment is usually on the lines of colorectal adenocarcinomas.  -Surgical pathology is consistent with low-grade well-differentiated neuroendocrine tumor with Ki-67 3%.  -The size of appendiceal neoplasm is small [<2cm), and literature supports appendectomy alone for small tumors, however there is concern that in view of deep invasion (T3 disease), and no lymph node sampling available patient may require right hemicolectomy with regional lymph node dissection.  -Staging scans as above reported negative for metastatic disease.  -Case reviewed with colorectal surgery [Dr. Yin]; recommended surveillance instead of more extensive surgery low-grade disease and distal appendiceal neoplasm.  Chromogranin A levels were reported negative.  CT chest as above reported unremarkable for metastatic disease.  -Given his young age and multiple health concerns, we will continue surveillance alongside colorectal surgery with clinical and radiographic evaluation every 6-12 months.    Mild transaminitis:  -Previously noted significant derangement of LFTs on initial visit was likely a lab error versus drug-induced as repeat CMP a few days later at [facility] showed significant improvement in LFTs and the patient remained asymptomatic throughout.  -Again noted to have mild elevation of ALP.  ALT and AST levels were WNL as per labs on 7/5/2024  -Discussed with patient, this could be likely secondary to hepatic steatosis versus medication induced   -counseled on weight loss  -Patient requested for second opinion with gastroenterology; he was referred to I at his request.      Hemorrhoidal bleeding:  -This is a chronic problem for the patient, referral to colorectal surgery as above.    Fatigue:  -Likely multifactorial, secondary to multiple medical and psychiatric problems.  -CBC today reported unremarkable for anemia.  Iron  profile not consistent with iron deficiency    Folate deficiency:  -Patient noted to have low folic acid levels 3.5  -Patient was prescribed oral folate supplementation, likely not covered by insurance.  -Advised to start over-the-counter multivitamin supplementation      Psychosocial issues:  -Patient is noted to have gender dysphoria and is undergoing workup for female to male transition surgeries including bilateral mastectomies and hysterectomy.  Currently on testosterone supplementation therapy.  -He appears to have some component of depression/anxiety, noted to have pressured speech and generalized worrying about most things.  -The extent of social support for the patient available is unclear at this moment.  -Will request for  evaluation to assess any social challenges and or needs.    Follow-up in 6 months with repeat labs.  Follow-up sooner as needed.      Thank you very much for providing the opportunity to participate in this patient’s care. Please do not hesitate to call if there are any other questions.

## 2024-07-15 ENCOUNTER — OFFICE VISIT (OUTPATIENT)
Dept: ONCOLOGY | Facility: CLINIC | Age: 24
End: 2024-07-15
Payer: MEDICAID

## 2024-07-15 VITALS
SYSTOLIC BLOOD PRESSURE: 116 MMHG | OXYGEN SATURATION: 95 % | HEIGHT: 64 IN | WEIGHT: 206.4 LBS | BODY MASS INDEX: 35.24 KG/M2 | DIASTOLIC BLOOD PRESSURE: 80 MMHG | HEART RATE: 88 BPM

## 2024-07-15 DIAGNOSIS — D3A.8 NEUROENDOCRINE NEOPLASM OF APPENDIX: ICD-10-CM

## 2024-07-15 DIAGNOSIS — D64.9 ANEMIA, UNSPECIFIED TYPE: Primary | ICD-10-CM

## 2024-07-15 DIAGNOSIS — R74.01 TRANSAMINITIS: ICD-10-CM

## 2024-07-15 DIAGNOSIS — R53.83 FATIGUE, UNSPECIFIED TYPE: ICD-10-CM

## 2024-07-15 DIAGNOSIS — K64.9 HEMORRHOIDS, UNSPECIFIED HEMORRHOID TYPE: ICD-10-CM

## 2024-07-15 PROCEDURE — 99214 OFFICE O/P EST MOD 30 MIN: CPT | Performed by: STUDENT IN AN ORGANIZED HEALTH CARE EDUCATION/TRAINING PROGRAM

## 2024-07-15 PROCEDURE — 1126F AMNT PAIN NOTED NONE PRSNT: CPT | Performed by: STUDENT IN AN ORGANIZED HEALTH CARE EDUCATION/TRAINING PROGRAM

## 2024-07-16 DIAGNOSIS — D37.3 APPENDICEAL TUMOR: ICD-10-CM

## 2024-07-16 DIAGNOSIS — D72.829 LEUKOCYTOSIS, UNSPECIFIED TYPE: Primary | ICD-10-CM

## 2024-08-06 ENCOUNTER — TELEPHONE (OUTPATIENT)
Age: 24
End: 2024-08-06
Payer: MEDICAID

## 2024-08-12 ENCOUNTER — OFFICE VISIT (OUTPATIENT)
Age: 24
End: 2024-08-12
Payer: MEDICAID

## 2024-08-12 VITALS
BODY MASS INDEX: 34.31 KG/M2 | RESPIRATION RATE: 18 BRPM | HEIGHT: 64 IN | WEIGHT: 201 LBS | SYSTOLIC BLOOD PRESSURE: 117 MMHG | OXYGEN SATURATION: 97 % | TEMPERATURE: 97.9 F | HEART RATE: 96 BPM | DIASTOLIC BLOOD PRESSURE: 87 MMHG

## 2024-08-12 DIAGNOSIS — C7A.8 NEUROENDOCRINE CARCINOMA OF APPENDIX: Primary | ICD-10-CM

## 2024-08-12 PROCEDURE — 1159F MED LIST DOCD IN RCRD: CPT | Performed by: STUDENT IN AN ORGANIZED HEALTH CARE EDUCATION/TRAINING PROGRAM

## 2024-08-12 PROCEDURE — 99204 OFFICE O/P NEW MOD 45 MIN: CPT | Performed by: STUDENT IN AN ORGANIZED HEALTH CARE EDUCATION/TRAINING PROGRAM

## 2024-08-12 PROCEDURE — 1160F RVW MEDS BY RX/DR IN RCRD: CPT | Performed by: STUDENT IN AN ORGANIZED HEALTH CARE EDUCATION/TRAINING PROGRAM

## 2024-08-13 NOTE — PROGRESS NOTES
Colorectal Surgery Consultation Note    ID:  Damian Casillas;   : 2000  DATE OF VISIT: 2024    Chief Complaint  NEW PATIENT  (NEW PATIENT/ REF KARISSA/ HEMORRHOIDS. PT BELIEVES HAS A SMALL RECTAL PROLAPSE  HAS BEEN DX W IBS)       History of Present Illness  Damian Casillas is a 24 y.o. adult who I was asked to see in consultation by Dr. Ly, Peter Suero MD to evaluate for neuroendocrine carcinoma of the appendix.    Patient underwent uncomplicated appendectomy with Dr. Woodruff on May 2024.  Pathology was consistent with a 0.9 cm well-differentiated appendiceal neuroendocrine carcinoma and the distal half of the appendix.  All margins were negative.  Ki-67 was less than 3%, tumor invades the subserosa of the appendix however no involvement of the retroperitoneum.     Patient has recovered well without any complication.  Reports chronic constipation spending significant amount of time in the bathroom.  Reports bleeding with the stool has stopped.  Stool is normal to loose in consistency.  Denies any pain with bowel movements.  Denies any itching.  Denies any protrusion of tissue while straining.     Last colonoscopy: 1 year ago, unknown result    SPECIMEN   Procedure  Appendectomy   TUMOR   Tumor Site  Distal half of appendix   Histologic Type and Grade  G1, well-differentiated neuroendocrine tumor   Histologic Grade Determination     Ki-67 Labeling Index  Less than 3%   Tumor Size  Greatest Dimension (Centimeters): 0.9 cm   Tumor Extent  Invades subserosa or mesoappendix without involvement of visceral peritoneum   Lymphatic and / or Vascular Invasion  Not identified   Perineural Invasion  Not identified   MARGINS   Margin Status  All margins negative for tumor   Closest Margin(s) to Tumor  Proximal   Distance from Tumor to Closest Margin  Greater than 1 cm      Past Medical History  Past Medical History:   Diagnosis Date    ADHD     Allergic rhinitis     Anemia     Arthritis     Asthma     Autism      level 2    Bipolar 1 disorder     Depression     Depressive disorder     Dyslipidemia     Dysmenorrhea     Endocrine disorder     Environmental allergies     Fatigue     Fibrocystic breast     Fibromyalgia     Gender dysphoria     GERD (gastroesophageal reflux disease)     History of MRSA infection     History of suicide attempt     Hyperchloremia     Hyperlordosis deformity of lumbar spine     Hypertriglyceridemia     Hypovitaminosis D     Overactive bladder     Panic     PTSD (post-traumatic stress disorder)     Reactive attachment disorder     TMJ (dislocation of temporomandibular joint)     Vertigo      Past Surgical History  Past Surgical History:   Procedure Laterality Date    APPENDECTOMY N/A 5/27/2024    Procedure: APPENDECTOMY LAPAROSCOPIC;  Surgeon: Ming Woodruff MD;  Location: [facility] MAIN OR;  Service: General;  Laterality: N/A;    COLONOSCOPY  09/01/2023    ENDOSCOPY  09/01/2023    MASS EXCISION Left     arm    NECK MASS EXCISION       Family History  Family History   Problem Relation Age of Onset    No Known Problems Mother     Mental illness Father     Asthma Father     Heart disease Father      No colorectal cancer history in immediate family members.  Social History  Social History     Tobacco Use    Smoking status: Never     Passive exposure: Past    Smokeless tobacco: Never    Tobacco comments:     Vapes every other day   Vaping Use    Vaping status: Some Days    Substances: Nicotine, THC, CBD    Devices: Disposable   Substance Use Topics    Alcohol use: No    Drug use: No     For further details please see Health History Questionnaire scanned in Epic.  Medication List  @medcurrent@  Allergies  Allergies   Allergen Reactions    Cariprazine Nausea And Vomiting, Palpitations and Shortness Of Breath    Fluoxetine Hcl Other (See Comments)    Lamictal [Lamotrigine] Nausea Only    Latex Unknown - Low Severity    Fluoxetine Rash    Testosterone Cypionate Itching, Rash and Swelling     Review of  Systems  All systems reviewed and are otherwise negative, pertinent positives noted in the HPI and Health History Questionnaire scanned in Epic.    Physical Exam  General:  No acute distress  Head: Normocephalic, atraumatic  Neuro: Alert and oriented    Abdomen:  Soft, non-tender, non-distended, no masses, no hernias, no hepatomegaly, no splenomegaly. No abnormal, audible bowel sounds.    External anorectal exam: Deferred per patient's request      Assessment  -Well-differentiated neuroendocrine carcinoma of the appendix    Plan / Recommendations      1. I had an in-depth discussion with the patient about the pathophysiology and staging of neuroendocrine carcinoma, including a thorough review of prognosis and the treatment algorithm. The pathology report indicates a well-differentiated neuroendocrine tumor less than a centimeter in size, located in the distal half of the appendix, with a Ki-67 index of less than 3%. These factors are all favorable for prognosis. Lymph node metastasis was noted at 0.9 cm, with a Ki-67 index also under 3%. Given these findings, I recommend continuing with clinical surveillance. Additionally, considering the recent bleeding symptoms and the fact that the last colonoscopy was over a year ago, I have suggested scheduling a colonoscopy at the patient’s convenience.          2. I have also personally reviewed the operating reports, pathology, laboratory studies, CT scans noting appendiceal neuroendocrine tumor.     3. I have discussed case with Dr. Ly regarding my recommendations.     4. Recommended starting metamucil fiber and increase water     5. Follow up after recover from upcoming surgery     Mark Yin MD  Colon and Rectal Surgery   [facility]   [address] [place], [place], [address]  T: [phone]

## 2024-08-23 ENCOUNTER — PATIENT ROUNDING (BHMG ONLY) (OUTPATIENT)
Age: 24
End: 2024-08-23
Payer: MEDICAID

## 2024-08-23 NOTE — PROGRESS NOTES
August 23, 2024    Hello, may I speak with Damian Casillas?    My name is randy BACA     I am  with [facility]  [address] [place] [address] [phone].    Before we get started may I verify your date of birth? 2000    I am calling to officially welcome you to our practice and ask about your recent visit. Is this a good time to talk? yes    Tell me about your visit with us. What things went well? Everything went well       We're always looking for ways to make our patients' experiences even better. Do you have recommendations on ways we may improve?  no    Overall were you satisfied with your first visit to our practice? yes       I appreciate you taking the time to speak with me today. Is there anything else I can do for you? no      Thank you, and have a great day.

## 2025-04-18 NOTE — PROGRESS NOTES
HEMATOLOGY ONCOLOGY OUTPATIENT FOLLOW UP       Patient name: Damian Casillas  : 2000  MRN: [id number]  Primary Care Physician: Jackie Uriarte  Referring Physician: No ref. provider found  Reason For Consult:       History of Present Illness:  Patient is a 25 y.o. adult born female but identifies as male with a CMH of ADHD, asthma, autism, bipolar 1 disorder, depression, anxiety, dyslipidemia, obesity, overactive bladder, PTSD, gender dysphoria has been on testosterone for 2 years scheduled for breast removal surgery in August and hysterectomy for female to male transition who presented to [facility] upon transfer from [facility] emergency department on 2024 where he presented with complaints of right lower and mid abdominal pain.    He reported he has IBS and has chronic pain however this intensified about 8 AM this morning after a bowel movement.  He reports he took an Imodium and a  laxative suppository this morning.  He reports a bowel movement was loose .  He reports frequent bloody stool and has had a colonoscopy in the past was told he had IBS.  He also reports mucus in the stool.  He reports subjective fever but is afebrile here.  He reported nausea at home and forced himself to vomit without hematemesis.  He reports urinary frequency which is not uncommon for him but denies any dysuria.   Labs from outlying facility show urine drug screen positive for cannabinoids and tricyclic antidepressants.  Lipase was 111, lactic acid 2.3,Labs showed an ALT of 70 AST of 45 albumin 5.1, WBC 13.8 calcium 10.7. [facility].  He was given a one-time dose of IV Zosyn in the emergency department.     24: CT ABDOMEN/PELVIS W CONTRAST:  Impression:  There are findings that would suggest early acute appendicitis.      24: Appendix, appendectomy:  Final Diagnosis    Well-differentiated neuroendocrine tumor, G1 (pT3, pN, see synoptic report and comment)    Tumor extends to subserosa    All surgical margins negative for tumor    Acute appendicitis  Comment    A low-grade tumor showing a nested pattern is present in the distal aspect of the appendix.  Immunohistochemistry was performed using appropriate controls and the tumor is positive for CD56, chromogranin and synaptophysin.  The staining pattern confirms the tumor as a well-differentiated neuroendocrine tumor.  A Ki-67 immunostain displays a proliferation index of less than 3% which is consistent with a grade 1 well-differentiated neuroendocrine tumor.  All surgical margins are negative.        Has been referred to us for further evaluation and management for incidental diagnosis of neuroendocrine tumor of the appendix.    6/18/2024: Patient presents for initial consultation today.  The patient appears to have an anxious outlook and appears to be very worried/stressed out due to recent surgical findings.   The patient reported having underlying diagnosis of IBS with predominant constipation component, however he experiences intermittent fecal urgency.  As per medical history of rectal hemorrhoids and intermittent anal bleeding since 2022.  He reported that he experiences significant symptoms of heartburn for which he is taking oral PPIs.    In terms of current symptomatology, he reported having Intense abd pain off and on.  This pain appears to be chronic in nature and per patient he had undergone a colonoscopy for the symptoms in September 2023 at [facility] and he was told that he has inflammatory changes in the bowel [per patient].    Has ongoing Fatigue, patient is concerned about anemia. No iron infusions or RBC transfusions before.    7/12/2024: CT chest with contrast:  Impression:     1. No evidence of metastatic disease within the thorax.  2. Hepatic steatosis.       7/15/24: Patient seen today for follow-up on  imaging as above.  Continues to be anxious about his surgery and overall prognosis.  No new complaints reported since last office visit.  Patient was found to have mild hematuria and UA positive for UTI about 1 week ago, he has completed course of oral antibiotics followed by improvement in symptoms.  Review of systems otherwise unremarkable.    History of Present Illness  The patient presents for  follow up visit.   Reported having symptoms of ongoing fatigue, episodes of dizziness, and a sensation of pain akin to a headache localized behind eyes. Difficulty in focusing her vision is described, with a persistent feeling of blurriness and a sensation similar to being underwater.   Mornings are characterized by urgent trips to the bathroom due to diarrhea, often accompanied by nausea upon waking. These symptoms are managed with dicyclomine,. However, dicyclomine and omeprazole were discontinued following a diagnosis of nonalcoholic fatty liver disease, identified after an appendectomy on 07/2024.   A liver biopsy is scheduled to investigate a prior transaminitis and Fatty liver disease, A history of elevated AST and ALT levels is noted, with a one-time reading of 585. Alkaline phosphatase levels have been consistently abnormal. Care is provided by Dr. Apollo Bashir, a gastroenterologist in [place].  Patient is worried about risk of Esophageal cancer due to GI symptoms and also GBM given family history and potential risk associated with Depo-provera use.  Physical exercise is limited due to fibromyalgia, which causes significant pain, and asthma. A tendency towards stress eating, particularly sweets, is acknowledged, and food consumption is limited to once a day due to financial constraints.    PAST SURGICAL HISTORY:  Double mastectomy  Hysterectomy  Appendectomy on 07/2024      Past Medical History:   Diagnosis Date    ADHD     Allergic rhinitis     Anemia     Arthritis     Asthma     Autism     level 2    Bipolar 1  "disorder     Depression     Depressive disorder     Dyslipidemia     Dysmenorrhea     Endocrine disorder     Environmental allergies     Fatigue     Fibrocystic breast     Fibromyalgia     Gender dysphoria     GERD (gastroesophageal reflux disease)     History of MRSA infection     History of suicide attempt     Hyperchloremia     Hyperlordosis deformity of lumbar spine     Hypertriglyceridemia     Hypovitaminosis D     Overactive bladder     Panic     PTSD (post-traumatic stress disorder)     Reactive attachment disorder     TMJ (dislocation of temporomandibular joint)     Vertigo        Past Surgical History:   Procedure Laterality Date    APPENDECTOMY N/A 5/27/2024    Procedure: APPENDECTOMY LAPAROSCOPIC;  Surgeon: Ming Woodruff MD;  Location: [facility] MAIN OR;  Service: General;  Laterality: N/A;    COLONOSCOPY  09/01/2023    ENDOSCOPY  09/01/2023    MASS EXCISION Left     arm    NECK MASS EXCISION           Current Outpatient Medications:     albuterol sulfate  (90 Base) MCG/ACT inhaler, Inhale 1-2 puffs every 4-6 hours as needed for shortness of breath, Disp: , Rfl:     B-D 3CC LUER-KEYLA SYR 12RM1-1/2 18G X 1-1/2\" 3 ML misc, USE TO DRAW UP MEDICATION FOR WEEKLY INJECTION, Disp: , Rfl:     B-D 3CC LUER-KEYLA SYR 25GX1\" 25G X 1\" 3 ML misc, USE FOR TESTOSTERONE INJECTION WEEKLY AS DIRECTED, Disp: , Rfl:     BD Hypodermic Needle 18G X 1\" misc, , Disp: , Rfl:     BD Syringe Slip Tip 25G X 5/8\" 1 ML misc, , Disp: , Rfl:     Caplyta 42 MG capsule, Take 1 capsule by mouth Daily., Disp: , Rfl:     cetirizine (zyrTEC) 10 MG tablet, Take 1 tablet by mouth As Needed for Allergies., Disp: , Rfl:     ciprofloxacin (CIPRO) 500 MG tablet, Take 1 tablet by mouth Every 12 (Twelve) Hours., Disp: , Rfl:     clonazePAM (KlonoPIN) 0.5 MG disintegrating tablet, Take 1 tablet by mouth 2 (Two) Times a Day., Disp: , Rfl:     Cyanocobalamin ([employer] B12 GUMMIES PO), Take  by mouth., Disp: , Rfl:     cyclobenzaprine (FLEXERIL) 10 MG " tablet, Take 1 tablet by mouth every night at bedtime., Disp: , Rfl:     dicyclomine (BENTYL) 10 MG capsule, Take 1 capsule by mouth 4 (Four) Times a Day Before Meals & at Bedtime., Disp: , Rfl:     DULoxetine (CYMBALTA) 20 MG capsule, Take 1 capsule by mouth Daily. Pt also takes 30 mg at hs, Disp: , Rfl:     gabapentin (NEURONTIN) 300 MG capsule, Take 1 capsule by mouth 3 (Three) Times a Day., Disp: , Rfl:     Gemtesa 75 MG tablet, Take 1 tablet by mouth Daily., Disp: , Rfl:     hydrOXYzine (ATARAX) 25 MG tablet, , Disp: , Rfl:     omeprazole (priLOSEC) 40 MG capsule, Take 1 capsule by mouth 2 (Two) Times a Day., Disp: , Rfl:     propranolol (INDERAL) 10 MG tablet, Take 1 tablet by mouth 3 (Three) Times a Day As Needed., Disp: , Rfl:     Testosterone Enanthate 200 MG/ML solution, INJECT 0.25ML IN THE MUSCLE EVERY WEEK, Disp: , Rfl:     Allergies   Allergen Reactions    Cariprazine Nausea And Vomiting, Palpitations and Shortness Of Breath    Fluoxetine Hcl Other (See Comments)    Lamictal [Lamotrigine] Nausea Only    Latex Unknown - Low Severity    Fluoxetine Rash    Testosterone Cypionate Itching, Rash and Swelling       Family History   Problem Relation Age of Onset    No Known Problems Mother     Mental illness Father     Asthma Father     Heart disease Father        Cancer-related family history is not on file.      Social History     Tobacco Use    Smoking status: Never     Passive exposure: Past    Smokeless tobacco: Never    Tobacco comments:     Vapes every other day   Vaping Use    Vaping status: Some Days    Substances: Nicotine, THC, CBD    Devices: Disposable   Substance Use Topics    Alcohol use: No    Drug use: No     Social History     Social History Narrative    Not on file       ROS:   Review of Systems   Constitutional:  Positive for fatigue.   Gastrointestinal:  Positive for abdominal pain, blood in stool and constipation.   Genitourinary:  Positive for dysuria.   Musculoskeletal:  Positive for  arthralgias.         Objective:    Vital Signs:  There were no vitals filed for this visit.      There is no height or weight on file to calculate BMI.    ECOG  (1) Restricted in physically strenuous activity, ambulatory and able to do work of light nature    Physical Exam:   Physical Exam  Constitutional:       Appearance: Normal appearance. He is normal weight.   HENT:      Head: Normocephalic and atraumatic.      Right Ear: External ear normal.      Left Ear: External ear normal.      Nose: Nose normal.      Mouth/Throat:      Mouth: Mucous membranes are moist.      Pharynx: Oropharynx is clear.   Eyes:      Extraocular Movements: Extraocular movements intact.      Conjunctiva/sclera: Conjunctivae normal.      Pupils: Pupils are equal, round, and reactive to light.   Cardiovascular:      Rate and Rhythm: Normal rate.      Pulses: Normal pulses.   Pulmonary:      Effort: Pulmonary effort is normal.   Abdominal:      General: Abdomen is flat.      Palpations: Abdomen is soft.   Musculoskeletal:         General: Normal range of motion.      Cervical back: Normal range of motion and neck supple.   Skin:     General: Skin is warm.   Neurological:      General: No focal deficit present.      Mental Status: He is alert and oriented to person, place, and time.   Psychiatric:         Mood and Affect: Mood is anxious.         Speech: Speech is rapid and pressured and tangential.         Judgment: Judgment normal.         Lab Results - Last 18 Months   Lab Units 07/05/24  1026 06/18/24  1546 05/26/24  2303   WBC 10*3/mm3 4.74 5.26 9.49   HEMOGLOBIN g/dL 13.0 12.3* 11.4*   HEMATOCRIT % 41.1 38.2 35.6*   PLATELETS 10*3/mm3 281 322 283   MCV fL 90.7 88.2 86.0     Lab Results - Last 18 Months   Lab Units 07/05/24  1026 06/18/24  1546 05/27/24  0615 05/26/24  2303 01/27/24  1152   SODIUM mmol/L 138 138  --  142 141   POTASSIUM mmol/L 3.5 4.0 3.5 3.4* 3.7   CHLORIDE mmol/L 104 107  --  110* 105   CO2 mmol/L 20.7* 19.7*  --  21.6*  "27.0   BUN mg/dL 10 7  --  7 11   CREATININE mg/dL 0.97 0.96  --  0.97 0.93   CALCIUM mg/dL 10.0 9.6  --  9.0 9.5   BILIRUBIN mg/dL 0.7 0.5  --   --  0.4   ALK PHOS U/L 140* 135*  --   --  118*   ALT (SGPT) U/L 42* 585*  --   --  40   AST (SGOT) U/L 37 188*  --   --  22   GLUCOSE mg/dL 97 96  --  121* 87       Lab Results   Component Value Date    GLUCOSE 97 07/05/2024    BUN 10 07/05/2024    CREATININE 0.97 07/05/2024    EGFRIFAFRI >60 02/02/2023    BCR 10.3 07/05/2024    K 3.5 07/05/2024    CO2 20.7 (L) 07/05/2024    CALCIUM 10.0 07/05/2024    ALBUMIN 4.8 07/05/2024    LABIL2 1.5 02/02/2023    AST 37 07/05/2024    ALT 42 (H) 07/05/2024       No results for input(s): \"APTT\", \"INR\", \"PTT\" in the last 04433 hours.    Lab Results   Component Value Date    IRON 96 06/18/2024    TIBC 483 06/18/2024    FERRITIN 59.00 06/18/2024       Lab Results   Component Value Date    FOLATE 3.53 (L) 06/18/2024       No results found for: \"OCCULTBLD\"    Lab Results   Component Value Date    RETICCTPCT 1.75 06/18/2024     Lab Results   Component Value Date    CNHTFLTU49 868 06/18/2024     No results found for: \"SPEP\", \"UPEP\"  No results found for: \"LDH\", \"URICACID\"  Lab Results   Component Value Date    SEDRATE 25 (H) 01/27/2024     No results found for: \"FIBRINOGEN\", \"HAPTOGLOBIN\"  No results found for: \"PTT\", \"INR\"  No results found for: \"\"  No results found for: \"CEA\"  No components found for: \"CA-19-9\"  No results found for: \"PSA\"  Lab Results   Component Value Date    SEDRATE 25 (H) 01/27/2024          Synoptic Checklist  APPENDIX NEUROENDOCRINE TUMOR (APPENDIX NEUROENDOCRINE TUMOR - All Specimens) 9th Edition - Protocol posted: 12/13/2023  SPECIMEN  Procedure Appendectomy  .  TUMOR  Tumor Site Distal half of appendix  Histologic Type and Grade G1, well-differentiated neuroendocrine tumor  Histologic Grade Determination  Ki-67 Labeling Index Less than 3%  Tumor Size Greatest Dimension (Centimeters): 0.9 cm  Tumor Extent " Invades subserosa or mesoappendix without involvement of visceral  peritoneum  Lymphatic and / or Vascular Invasion Not identified  Perineural Invasion Not identified  .  MARGINS  Margin Status All margins negative for tumor  Closest Margin(s) to Tumor Proximal  Distance from Tumor to Closest Margin Greater than 1 cm  .  REGIONAL LYMPH NODES  Regional Lymph Node Status Not applicable (no regional lymph nodes submitted or found)  .  pTNM CLASSIFICATION (AJCC Version 9)  Reporting of pT, pN, and (when applicable) pM categories is based on information available to the pathologist at the time the report is issued. As  per the AJCC (Chapter 1, 8th Ed.) it is the managing physician’s responsibility to establish the final pathologic stage based upon all pertinent  information, including but potentially not limited to this pathology report.  pT Category pT3  pN Category pN not assigned (no nodes submitted or found)  Page: 1      Assessment & Plan     Appendiceal neuroendocrine tumor: pT3Nx  -This is an incidental finding on appendectomy for suspected appendicitis.  -Discussed the diagnosis, pathophysiology of neuroendocrine tumors and recommended treatment approach for appendiceal neuroendocrine tumors.  The patient was explained that these tumors are quite uncommon and treatment is usually on the lines of colorectal adenocarcinomas.  -Surgical pathology is consistent with low-grade well-differentiated neuroendocrine tumor with Ki-67 3%.  -The size of appendiceal neoplasm is small [<2cm), and literature supports appendectomy alone for small tumors, however there is concern that in view of deep invasion (T3 disease), and no lymph node sampling available patient may require right hemicolectomy with regional lymph node dissection.  -Staging scans as above reported negative for metastatic disease.  -Case reviewed with colorectal surgery [Dr. Yin]; recommended surveillance instead of more extensive surgery low-grade disease  and distal appendiceal neoplasm.  Chromogranin A levels were reported negative.  CT chest as above reported unremarkable for metastatic disease.  -Given his young age and multiple health concerns, we will continue surveillance alongside colorectal surgery with clinical and radiographic evaluation every 6-12 months. Will schedule around 7/2025.    Mild transaminitis:Resolved  -Previously noted significant derangement of LFTs on initial visit was likely a lab error versus drug-induced as repeat CMP a few days later at [facility] showed significant improvement in LFTs and the patient remained asymptomatic throughout.  -Again noted to have mild elevation of ALP.  ALT and AST levels were WNL as per labs on 7/5/2024  -Discussed with patient, this could be likely secondary to hepatic steatosis versus medication induced   -counseled on weight loss  -Following with GI In [place], [place], recommended for liver biopsy.  -LFTs are normal at this time.        Hemorrhoidal bleeding:  -This is a chronic problem for the patient, referral to colorectal surgery as above.    Fatigue:  Iron and Folate deficiency:  -Likely multifactorial, secondary to multiple medical and psychiatric problems vs medication effect.  -labs showed low ferritin and folate levels. Will start patient on Oral Supplementation.    Psychosocial issues:  -Patient is undergoing female to male transition surgeries including bilateral mastectomies and hysterectomy.  Currently on testosterone supplementation therapy.  -He appears to have some component of depression/anxiety, noted to have pressured speech and generalized worrying about most things.  -The extent of social support for the patient available is unclear at this moment.  -Also expressed concern about financial issues and inability to afford healthy meals  -Will request for  evaluation to assess any social challenges and or needs.    Follow-up in 6 months with repeat labs and scans.   Follow-up sooner as needed.      Thank you very much for providing the opportunity to participate in this patient’s care. Please do not hesitate to call if there are any other questions.

## 2025-04-22 ENCOUNTER — LAB (OUTPATIENT)
Dept: LAB | Facility: HOSPITAL | Age: 25
End: 2025-04-22
Payer: MEDICAID

## 2025-04-22 ENCOUNTER — DOCUMENTATION (OUTPATIENT)
Dept: ONCOLOGY | Facility: HOSPITAL | Age: 25
End: 2025-04-22
Payer: MEDICAID

## 2025-04-22 ENCOUNTER — OFFICE VISIT (OUTPATIENT)
Dept: ONCOLOGY | Facility: CLINIC | Age: 25
End: 2025-04-22
Payer: MEDICAID

## 2025-04-22 VITALS
HEART RATE: 67 BPM | SYSTOLIC BLOOD PRESSURE: 111 MMHG | OXYGEN SATURATION: 97 % | BODY MASS INDEX: 33.32 KG/M2 | WEIGHT: 195.2 LBS | HEIGHT: 64 IN | DIASTOLIC BLOOD PRESSURE: 77 MMHG

## 2025-04-22 DIAGNOSIS — R74.01 TRANSAMINITIS: ICD-10-CM

## 2025-04-22 DIAGNOSIS — D50.0 IRON DEFICIENCY ANEMIA DUE TO CHRONIC BLOOD LOSS: ICD-10-CM

## 2025-04-22 DIAGNOSIS — D37.3 APPENDICEAL TUMOR: Primary | ICD-10-CM

## 2025-04-22 DIAGNOSIS — E53.8 FOLATE DEFICIENCY: ICD-10-CM

## 2025-04-22 DIAGNOSIS — R53.83 FATIGUE, UNSPECIFIED TYPE: ICD-10-CM

## 2025-04-22 DIAGNOSIS — D3A.8 NEUROENDOCRINE NEOPLASM OF APPENDIX: ICD-10-CM

## 2025-04-22 DIAGNOSIS — D37.3 APPENDICEAL TUMOR: ICD-10-CM

## 2025-04-22 DIAGNOSIS — K64.9 HEMORRHOIDS, UNSPECIFIED HEMORRHOID TYPE: ICD-10-CM

## 2025-04-22 LAB
ALBUMIN SERPL-MCNC: 4.8 G/DL (ref 3.5–5.2)
ALBUMIN/GLOB SERPL: 1.6 G/DL
ALP SERPL-CCNC: 114 U/L (ref 39–117)
ALT SERPL W P-5'-P-CCNC: 74 U/L (ref 1–41)
ANION GAP SERPL CALCULATED.3IONS-SCNC: 11.1 MMOL/L (ref 5–15)
AST SERPL-CCNC: 27 U/L (ref 1–40)
BASOPHILS # BLD AUTO: 0 10*3/MM3 (ref 0–0.2)
BASOPHILS NFR BLD AUTO: 0 % (ref 0–1.5)
BILIRUB SERPL-MCNC: 0.3 MG/DL (ref 0–1.2)
BUN SERPL-MCNC: 12 MG/DL (ref 6–20)
BUN/CREAT SERPL: 12.2 (ref 7–25)
CALCIUM SPEC-SCNC: 10.1 MG/DL (ref 8.6–10.5)
CHLORIDE SERPL-SCNC: 102 MMOL/L (ref 98–107)
CO2 SERPL-SCNC: 25.9 MMOL/L (ref 22–29)
CREAT SERPL-MCNC: 0.98 MG/DL (ref 0.76–1.27)
DEPRECATED RDW RBC AUTO: 43.6 FL (ref 37–54)
EGFRCR SERPLBLD CKD-EPI 2021: 109.7 ML/MIN/1.73
EOSINOPHIL # BLD AUTO: 0.01 10*3/MM3 (ref 0–0.4)
EOSINOPHIL NFR BLD AUTO: 0.2 % (ref 0.3–6.2)
ERYTHROCYTE [DISTWIDTH] IN BLOOD BY AUTOMATED COUNT: 13.9 % (ref 12.3–15.4)
FERRITIN SERPL-MCNC: 18.3 NG/ML (ref 30–400)
GLOBULIN UR ELPH-MCNC: 3 GM/DL
GLUCOSE SERPL-MCNC: 97 MG/DL (ref 65–99)
HCT VFR BLD AUTO: 39.1 % (ref 37.5–51)
HGB BLD-MCNC: 12.9 G/DL (ref 13–17.7)
IRON 24H UR-MRATE: 62 MCG/DL (ref 59–158)
IRON SATN MFR SERPL: 12 % (ref 20–50)
LYMPHOCYTES # BLD AUTO: 1.8 10*3/MM3 (ref 0.7–3.1)
LYMPHOCYTES NFR BLD AUTO: 35.3 % (ref 19.6–45.3)
MCH RBC QN AUTO: 29 PG (ref 26.6–33)
MCHC RBC AUTO-ENTMCNC: 33 G/DL (ref 31.5–35.7)
MCV RBC AUTO: 87.9 FL (ref 79–97)
MONOCYTES # BLD AUTO: 0.25 10*3/MM3 (ref 0.1–0.9)
MONOCYTES NFR BLD AUTO: 4.9 % (ref 5–12)
NEUTROPHILS NFR BLD AUTO: 3.04 10*3/MM3 (ref 1.7–7)
NEUTROPHILS NFR BLD AUTO: 59.6 % (ref 42.7–76)
PLATELET # BLD AUTO: 280 10*3/MM3 (ref 140–450)
PMV BLD AUTO: 8.8 FL (ref 6–12)
POTASSIUM SERPL-SCNC: 4.1 MMOL/L (ref 3.5–5.2)
PROT SERPL-MCNC: 7.8 G/DL (ref 6–8.5)
RBC # BLD AUTO: 4.45 10*6/MM3 (ref 4.14–5.8)
SODIUM SERPL-SCNC: 139 MMOL/L (ref 136–145)
TIBC SERPL-MCNC: 535 MCG/DL (ref 298–536)
TRANSFERRIN SERPL-MCNC: 359 MG/DL (ref 200–360)
WBC NRBC COR # BLD AUTO: 5.1 10*3/MM3 (ref 3.4–10.8)

## 2025-04-22 PROCEDURE — 82607 VITAMIN B-12: CPT | Performed by: STUDENT IN AN ORGANIZED HEALTH CARE EDUCATION/TRAINING PROGRAM

## 2025-04-22 PROCEDURE — 82728 ASSAY OF FERRITIN: CPT | Performed by: STUDENT IN AN ORGANIZED HEALTH CARE EDUCATION/TRAINING PROGRAM

## 2025-04-22 PROCEDURE — 36415 COLL VENOUS BLD VENIPUNCTURE: CPT

## 2025-04-22 PROCEDURE — 83540 ASSAY OF IRON: CPT | Performed by: STUDENT IN AN ORGANIZED HEALTH CARE EDUCATION/TRAINING PROGRAM

## 2025-04-22 PROCEDURE — 99214 OFFICE O/P EST MOD 30 MIN: CPT | Performed by: STUDENT IN AN ORGANIZED HEALTH CARE EDUCATION/TRAINING PROGRAM

## 2025-04-22 PROCEDURE — 80053 COMPREHEN METABOLIC PANEL: CPT | Performed by: STUDENT IN AN ORGANIZED HEALTH CARE EDUCATION/TRAINING PROGRAM

## 2025-04-22 PROCEDURE — 1126F AMNT PAIN NOTED NONE PRSNT: CPT | Performed by: STUDENT IN AN ORGANIZED HEALTH CARE EDUCATION/TRAINING PROGRAM

## 2025-04-22 PROCEDURE — 85025 COMPLETE CBC W/AUTO DIFF WBC: CPT

## 2025-04-22 PROCEDURE — 82746 ASSAY OF FOLIC ACID SERUM: CPT | Performed by: STUDENT IN AN ORGANIZED HEALTH CARE EDUCATION/TRAINING PROGRAM

## 2025-04-22 PROCEDURE — 84466 ASSAY OF TRANSFERRIN: CPT | Performed by: STUDENT IN AN ORGANIZED HEALTH CARE EDUCATION/TRAINING PROGRAM

## 2025-04-22 RX ORDER — IBUPROFEN 600 MG/1
600 TABLET, FILM COATED ORAL
COMMUNITY
Start: 2024-10-31 | End: 2025-04-22

## 2025-04-22 RX ORDER — POLYETHYLENE GLYCOL 3350 17 G/17G
17 POWDER, FOR SOLUTION ORAL
COMMUNITY
Start: 2024-10-31

## 2025-04-22 RX ORDER — OXYBUTYNIN CHLORIDE 10 MG/1
1 TABLET, EXTENDED RELEASE ORAL EVERY 12 HOURS SCHEDULED
COMMUNITY
Start: 2025-03-25

## 2025-04-22 RX ORDER — SIMETHICONE 80 MG
80 TABLET,CHEWABLE ORAL
COMMUNITY
Start: 2024-10-31 | End: 2025-04-22

## 2025-04-22 RX ORDER — TRIAMCINOLONE ACETONIDE 55 UG/1
1 SPRAY, METERED NASAL DAILY
COMMUNITY
Start: 2025-03-06

## 2025-04-22 RX ORDER — ONDANSETRON 4 MG/1
4 TABLET, ORALLY DISINTEGRATING ORAL
COMMUNITY
Start: 2024-10-31 | End: 2025-04-22

## 2025-04-22 NOTE — PROGRESS NOTES
OSW was asked to see pt re basic needs.  Met with pt who stated that he receives food stamps, but does not have enough to eat.  Provided pt with a resource list for [facility] which includes food resources.  Encouraged pt to reach out as needed.

## 2025-04-23 DIAGNOSIS — D64.9 ANEMIA, UNSPECIFIED TYPE: Primary | ICD-10-CM

## 2025-04-23 LAB
FOLATE SERPL-MCNC: 2.48 NG/ML (ref 4.78–24.2)
VIT B12 BLD-MCNC: 479 PG/ML (ref 211–946)

## 2025-04-23 RX ORDER — FOLIC ACID 1 MG/1
1 TABLET ORAL DAILY
Qty: 30 TABLET | Refills: 2 | Status: SHIPPED | OUTPATIENT
Start: 2025-04-23

## 2025-04-23 RX ORDER — FERROUS SULFATE 325(65) MG
325 TABLET ORAL
Qty: 30 TABLET | Refills: 2 | Status: SHIPPED | OUTPATIENT
Start: 2025-04-23

## 2025-04-25 LAB — CGA SERPL-MCNC: 30.3 NG/ML (ref 0–101.8)

## 2025-07-28 DIAGNOSIS — D64.9 ANEMIA, UNSPECIFIED TYPE: ICD-10-CM

## 2025-07-28 RX ORDER — FOLIC ACID 1 MG/1
1 TABLET ORAL DAILY
Qty: 30 TABLET | Refills: 2 | Status: SHIPPED | OUTPATIENT
Start: 2025-07-28

## (undated) DEVICE — ANTIBACTERIAL UNDYED BRAIDED (POLYGLACTIN 910), SYNTHETIC ABSORBABLE SUTURE: Brand: COATED VICRYL

## (undated) DEVICE — PASS SUT PRO BARIATRIC XL W/TROC SWABS

## (undated) DEVICE — TOTAL TRAY, DB, 100% SILI FOLEY, 16FR 10: Brand: MEDLINE

## (undated) DEVICE — SOL IRRIG NACL 1000ML

## (undated) DEVICE — ENDOPATH XCEL WITH OPTIVIEW TECHNOLOGY BLADELESS TROCARS WITH STABILITY SLEEVES: Brand: ENDOPATH XCEL OPTIVIEW

## (undated) DEVICE — ENDOPATH XCEL BLADELESS TROCARS WITH STABILITY SLEEVES: Brand: ENDOPATH XCEL

## (undated) DEVICE — ENDOPATH XCEL WITH OPTIVIEW TECHNOLOGY UNIVERSAL TROCAR STABILITY SLEEVES: Brand: ENDOPATH XCEL OPTIVIEW

## (undated) DEVICE — SLV SCD CALF HEMOFORCE DVT THERP REPROC MD

## (undated) DEVICE — UNDERGLV SURG BIOGEL INDICATOR LTX PF 7

## (undated) DEVICE — ADHS SKIN PREMIERPRO EXOFIN TOPICAL HI/VISC .5ML

## (undated) DEVICE — NDL HYPO PRECISIONGLIDE REG 22G 1 1/2

## (undated) DEVICE — GENERAL LAPAROSCOPY CDS: Brand: MEDLINE INDUSTRIES, INC.

## (undated) DEVICE — GLV SURG BIOGEL LTX PF 7

## (undated) DEVICE — 40580 - THE PINK PAD - ADVANCED TRENDELENBURG POSITIONING KIT: Brand: 40580 - THE PINK PAD - ADVANCED TRENDELENBURG POSITIONING KIT

## (undated) DEVICE — BG RETRV TISS SUPERBAG INTRO RIP/STOP NLY 10MM 240ML MD

## (undated) DEVICE — SUT VIC 0 UR6 27IN VCP603H

## (undated) DEVICE — UNIVERSAL STAPLER: Brand: ENDO GIA ULTRA

## (undated) DEVICE — KT SURG TURNOVER 050

## (undated) DEVICE — SOLUTION,WATER,IRRIGATION,1000ML,STERILE: Brand: MEDLINE

## (undated) DEVICE — SYR LL TP 10ML STRL

## (undated) DEVICE — INSUFFLATION TUBING SET, ENDOFLATOR 50: Brand: N.A.